# Patient Record
Sex: FEMALE | Race: WHITE | NOT HISPANIC OR LATINO | Employment: FULL TIME | URBAN - METROPOLITAN AREA
[De-identification: names, ages, dates, MRNs, and addresses within clinical notes are randomized per-mention and may not be internally consistent; named-entity substitution may affect disease eponyms.]

---

## 2019-09-04 ENCOUNTER — TELEPHONE (OUTPATIENT)
Dept: GASTROENTEROLOGY | Facility: CLINIC | Age: 39
End: 2019-09-04

## 2019-09-04 NOTE — TELEPHONE ENCOUNTER
Called & spoke to pt. Scheduled her for appt 9/11/19 at 2pm w/ 1:30pm check-in. Verified pts email. Sent new pt email.

## 2019-09-06 ENCOUNTER — TELEPHONE (OUTPATIENT)
Dept: GASTROENTEROLOGY | Facility: CLINIC | Age: 39
End: 2019-09-06

## 2019-09-06 NOTE — TELEPHONE ENCOUNTER
Called pt to confirm appoint on 09/11 @ 2 with a 1:30 arrival  Pt stated she was actually trying to reach us to cancel that appointment  She will be having surgery that day   She would like to know when she can reschedule  I explained I will send the message over and we will be in touch to r/s appoint   Wished her the best on surgery

## 2019-09-06 NOTE — TELEPHONE ENCOUNTER
LM for pt informing her that I spoke to Dr Vazquez who in turn spoke to Dr Plunkett. Informed her that following surgery, once Dr Plunkett deems her fit, he can re-refer her to us & we will get her scheduled for an appt. Left direct line for any questions [phone]

## 2019-10-01 ENCOUNTER — TELEPHONE (OUTPATIENT)
Dept: GASTROENTEROLOGY | Facility: CLINIC | Age: 39
End: 2019-10-01

## 2019-10-01 NOTE — TELEPHONE ENCOUNTER
----- Message from Nahum Mcleod sent at 10/1/2019 12:32 PM CDT -----  Contact: Pt  Type:  Needs Medical Advice    Who Called: The caller would like a call back to schedule an appt.    Best Call Back Number: [phone]

## 2019-10-01 NOTE — TELEPHONE ENCOUNTER
Called & spoke to pt. Rescheduled for 11/25/19 at 11 am w/ 10:30 arrival. Resent new pt email w/ updated info. Pt expressed understanding.

## 2019-10-31 ENCOUNTER — TELEPHONE (OUTPATIENT)
Dept: GASTROENTEROLOGY | Facility: CLINIC | Age: 39
End: 2019-10-31

## 2019-10-31 ENCOUNTER — OFFICE VISIT (OUTPATIENT)
Dept: GASTROENTEROLOGY | Facility: CLINIC | Age: 39
End: 2019-10-31
Payer: COMMERCIAL

## 2019-10-31 VITALS
OXYGEN SATURATION: 99 % | SYSTOLIC BLOOD PRESSURE: 104 MMHG | BODY MASS INDEX: 20.41 KG/M2 | HEIGHT: 66 IN | WEIGHT: 127 LBS | HEART RATE: 77 BPM | TEMPERATURE: 98 F | RESPIRATION RATE: 14 BRPM | DIASTOLIC BLOOD PRESSURE: 68 MMHG

## 2019-10-31 DIAGNOSIS — K21.9 GASTROESOPHAGEAL REFLUX DISEASE, ESOPHAGITIS PRESENCE NOT SPECIFIED: ICD-10-CM

## 2019-10-31 DIAGNOSIS — K50.00 CROHN'S DISEASE OF SMALL INTESTINE WITHOUT COMPLICATION: Primary | ICD-10-CM

## 2019-10-31 DIAGNOSIS — M12.80: ICD-10-CM

## 2019-10-31 DIAGNOSIS — D84.9 IMMUNOSUPPRESSED STATUS: ICD-10-CM

## 2019-10-31 PROCEDURE — 99245 OFF/OP CONSLTJ NEW/EST HI 55: CPT | Mod: S$GLB,,, | Performed by: INTERNAL MEDICINE

## 2019-10-31 PROCEDURE — 99999 PR PBB SHADOW E&M-EST. PATIENT-LVL IV: ICD-10-PCS | Mod: PBBFAC,,, | Performed by: INTERNAL MEDICINE

## 2019-10-31 PROCEDURE — 99999 PR PBB SHADOW E&M-EST. PATIENT-LVL IV: CPT | Mod: PBBFAC,,, | Performed by: INTERNAL MEDICINE

## 2019-10-31 PROCEDURE — 99245 PR OFFICE CONSULTATION,LEVEL V: ICD-10-PCS | Mod: S$GLB,,, | Performed by: INTERNAL MEDICINE

## 2019-10-31 RX ORDER — CHOLECALCIFEROL (VITAMIN D3) 125 MCG
5000 CAPSULE ORAL DAILY
COMMUNITY

## 2019-10-31 RX ORDER — FOLIC ACID 1 MG/1
1 TABLET ORAL DAILY
COMMUNITY
End: 2020-03-23

## 2019-10-31 RX ORDER — METRONIDAZOLE 500 MG/1
500 TABLET ORAL
COMMUNITY
Start: 2019-08-15 | End: 2020-01-27

## 2019-10-31 RX ORDER — NYSTATIN 500000 [USP'U]/1
500000 TABLET, COATED ORAL
COMMUNITY
Start: 2019-09-04 | End: 2020-01-27

## 2019-10-31 RX ORDER — IRON,CARBONYL/ASCORBIC ACID 100-250 MG
1 TABLET ORAL DAILY
COMMUNITY
Start: 2019-05-29

## 2019-10-31 RX ORDER — FAMOTIDINE 40 MG/1
40 TABLET, FILM COATED ORAL 2 TIMES DAILY
COMMUNITY
Start: 2019-09-04 | End: 2020-03-23

## 2019-10-31 RX ORDER — AZATHIOPRINE 50 MG/1
100 TABLET ORAL DAILY
COMMUNITY
Start: 2019-10-11 | End: 2022-11-09

## 2019-10-31 RX ORDER — NORGESTIMATE AND ETHINYL ESTRADIOL 0.25-0.035
1 KIT ORAL DAILY
Refills: 3 | COMMUNITY
Start: 2019-09-17 | End: 2020-01-27

## 2019-10-31 NOTE — PROGRESS NOTES
[facility]          Inflammatory Bowel Disease New Patient Consultation Note         TODAY'S VISIT DATE:  10/31/2019    Reason for Consult:    Chief Complaint   Patient presents with    Crohn's Disease     PCP: Fitz Frias      Referring MD:   Dr. Jason Plunkett    History of Present Illness:    Dear. Dr. Jason Plunkett    Thank you for requesting a consultation on your patient Angelina Castaneda who is a 39 y.o. female seen today at the [facility] on 10/31/2019 for inflammatory bowel disease- Crohn's disease.  Pertinent past medical history includes miscarriage (wokup for APS neg), 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, abnormal Pap smear with low-grade squamous intraepithelial lesion, vitamin B12 deficiency, GERD (on pepcid).      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 She had synovectomy.  CRP January 2006 was elevated at 35.4 with ESR 38 and anemia with hemoglobin 10.7.  Symptoms remitted thereafter with methotrexate 15 mg p.o. Weekly (helped with arthropathy and no SE).  At her visit in April 2007 she did report some intermittent pain in both lateral hips.  She continued on Voltaren 75 mg twice a day and methotrexate 15 mg p.o. weekly.  She was lost to follow-up between April 2007 and November 2007 due to insurance changes. In late summer 2007 she began with recurrent pain and swelling of the left knee and by that time had not been taking methotrexate on a regular basis though had decreased the dose to 10 mg once a week.  She restart methotrexate 15 mg once a week with Voltaren 75 mg twice a day.  This seemed to remit her symptoms but not completely resolve the symptoms and therefore biologic treatments discussed and Humira recommended.  She  started Humira and then was on 40 mg SC every 2 weeks while continuing methotrexate 15 mg p.o. once a week.  By 2009 she was in clinical remission with her joints and was not requiring Voltaren though had a flare up in October 2009 that coincided with her being 10 days late for her Humira and decreasing her methotrexate from 10 mg weekly to 7.5 mg weekly.  Her symptoms went into remission with a Medrol Dosepak and increase of methotrexate 10 mg weekly while she continued on Humira 40 mg SC every 2 weeks.  In June 2010 she had progressive and recurrent pain with swelling and stiffness of the left knee and had aspiration intra-articular steroids.  In August 2010 she had return of left knee symptoms and required another Medrol Dosepak.  At that time her Humira was continued and her methotrexate was increased to 15 mg p.o. once a week.  In summary 2011 she continued to have mild anemia with slightly decreased folate and B12 levels.  Overall she was feeling well by fall of 2011 except for some mild recurrence swelling of the left knee and stiffness.  She continued on Humira 40 mg SC every 2 weeks, methotrexate 15 mg p.o. weekly with folic acid 1 mg daily.  By 2013 her methotrexate and Humira discontinued due to ineffective.  In fall 2013 she started simponi and restarted MTX 10 mg po weekly 10/2013.     In December 2013 she had recurrent left knee pain and swelling and underwent aspiration and injection with Kenalog.  By May 2014 she continued on Simponi once a month and methotrexate 15 mg p.o. weekly.  In January 2015 she saw surgery for a large external hemorrhoid at which time the plan was to proceed with an external hemorrhoid excision.  Biopsies of the hemorrhoid suggested atypical epithelial change with suspicion for viral cytopathic changes and HPV negative.  Patient recurrence left knees pain and swelling in March of 2015 at which time aspiration and steroid injection was done.  At that time she had continued on  Simponi monthly and methotrexate 10 mg weekly.  By April 2015 her methotrexate was increased to 17.5 mg once a week while continuing on monthly Simponi.  In early June 2015 Simponi was discontinued and patient was started on Cimzia and by June 2015 she was on Cimzia every 2 weeks while continuing methotrexate 15 mg p.o. weekly.  In July 2015 due to recurrence symptoms of large knee inflammatory infusion she took a 2 week course of prednisone which was helpful.  She had repeat aspiration injection of steroids in July of 2015 of her left knee. After being on cimzia for about 2 mos her joint pains worsened considerably.  She started on xeljanz (unknown dose) which helped her joint pains and discontinued within a few mos and this was discontinued due to family planning in future.  She saw surgeons September 2015 again at which time she was found to have irritated anal skin with inflammation and bleeding associated with bowel movements with inflammed perianal skin tag with findings concerning for Crohn's disease. Patient saw Dr. Plunkett on 10/15/2015 and he felt that the perianal issues were more likely to be consistent with fistula.  By this time patient was on Xeljanz for her joint issues.  EGD November 2015 was normal. Colonoscopy showed severe colitis and ileitis in the ileocecal valve and unable to intubate the terminal ileum due to stricturing of the IC valve with mucosal ulcerations.  There is also granulation tissue internally like healed fissure and posterior midline and left lateral fistula granulation with prominence.  Otherwise the colon appeared normal. Patient was started on Pentasa, continued methotrexate and plans were to change from Xeljanz to Humira.  Also ciprofloxacin and Flagyl was started.  Biopsies of the duodenum were normal.  Biopsies of the IC valve showed chronic active colitis.  SBFT November 2015 showed loss of normal fold pattern in the patient's terminal ileum. Around Nov/Dec 2015 she  started Humira and MTX 15 mg po weekly. She was not tolerant of Flagyl.  At her visit with Dr. Plunkett December 2015 she requested to stop methotrexate (future family planning) and finally discontinued in January 2016 while continuing Humira 40 mg SC every 2 weeks and Pentasa.  By March 2016 she was feeling well on this regimen and had minimal bleeding once or twice a week with rare mucus.  She saw Rheumatology April 2016 and May 2016 after developing right knee pain and swelling at which time aspiration injection of steroids was done.  At her visit with Rheumatology May of 2016 her rheumatologist was going to coordinate with Gastroenterology to change Pentasa to sulfasalazine to help with joint issues.  In July 2016 got . In September of 2016 she again had right knee infusion that was aspirated and steroids were injected.  She became pregnant in the end of September 2016 (delivered healthy baby girl by Csection and baby was 3 weeks early- 5/29/17) at which time she was asymptomatic from her bowel disease and at her visit on 10/21/2016 Pentasa was discontinued and she was started on Flagyl.  There is notation of getting Humira levels but but this was deferred due to patient symptomatic improvement by in November of 2016.  She continued on Humira 40 mg SC every 2 weeks as monotherapy during pregnancy and stopped at 28 weeks per recs by Dr. Plunkett. In January 2017 she had aspiration and injection of steroids into her right knee due to her current knee pain and swelling.  She saw Dr. Plunkett 3/31/2017 at which time she was 28 weeks pregnant and he was instructed to discontinue Humira.  Approximately 2 weeks after delivery she developed some abdominal cramping, arthralgia and knee infusion and pain. Her rheumatologist resumed her Humira and she had a rapid response with improve knee pain and resolution of her abdominal cramping though some rectal bleeding including drips of blood with bowel movements  intermittently.  She was given Anusol HC cream for suspected perianal or hemorrhoidal bleeding and did not wish to resume Pentasa postpartum (resumed for 2 mos).  In mid September 2017 she had recurrence of right knee pain and swelling in saw her rheumatologist at which time the knee was again aspirated and injected with steroids.  She saw Dr. Plunkett in January 2018 with stable symptoms with the plan to do a colonoscopy November 2018.  She had tried sulfasalazine for joint issues early on and sometime 2018 she was started again on sulfasalazine which was discontinued 8/2019. Bone density test 8/10/2018 was normal. She underwent a colonoscopy on 8/27/2018 for generalized abdominal pain and chronic diarrhea which showed discontinues areas of nonbleeding ulcerated mucosa at IC valve consistent with Crohn's ulcer that was injected with epinephrine due to bleeding.  She had also perianal skin tags with some some rectal tenderness an anal stricture.  There was ulcerated mucosa at the anus and in the distal rectum.  Small pedunculated polypoid lesion at the IC valve most consistent with pseudopolyps and there was a benign-appearing intrinsic severe stenosis of the IC valve that did not allow intubation of the terminal ileum. Biopsies of the IC valve showed acutely inflamed mucosal ulcer with reactive changes and the polyp was consistent with an inflammatory hyperplastic polyp.  At that time Dr. Plunkett recommended ciprofloxacin and Flagyl for 1 week, prednisone and to increase Humira to 40 mg SC weekly.  Although I do not have the Humira levels there is a note indicating that she had low levels with no antibodies when this change was made.  She tapered off of prednisone by the end of September 2018 while continuing Humira 40 mg SC weekly.  In 92018 she had a miscarriage. At her visit on 9/26/2018 with Dr. Plunkett she mentioned that she would like to get pregnant again and he wrote that he felt that if her symptoms  improved during pregnancy then she can decrease her Humira at to 40 mg SC every 2 weeks.  Her symptoms did improved on Humira 40 mg SC weekly with some residual minor bloating and abdominal cramping with rare diarrhea.  In addition to the Humira she was also on sulfasalazine with folic acid.  By April 2019 she began to family plan again and at that time she had abdominal cramping with possible partial small-bowel obstruction for 2 days with intermittent episodes as well.  Dr. Plunktet was concerned that the IC valve stricture was playing a role in the symptoms and recommended repeat Humira levels and antibodies and if adequate levels then change to Remicade given loss of response of weekly Humira.  She did become pregnant and had a miscarriage in the early summer of 2019.  Dr. Plunkett had discontinued sulfasalazine during her short pregnancy though her maternal fetal medicine doctor recommend that she can remain on it.  Dr. Plunkett saw patient on 8/12/2019 at which time she was continued on Humira SC weekly and her sulfasalazine was increased to 3 g/day.  IV iron was also being considered.  On 8/15/2019 she was seen in the GI clinic again due to fever of 101, lower abdominal pain and discomfort with bilateral suprapubic and right lower quadrant pain. She had blood work showing leukocytosis with a pregnancy test that was negative.  CT abdomen pelvis with IV and oral contrast on 8/15/2019 showed interloop abscess in the right lower quadrant with thick walls and loculation measuring 5.1 x 3.7 cm with peripheral enhancement.  This is adjacent to a very abnormal loop of terminal ileum with significant wall thickening and inflammatory changes.  There is additional edematous changes in the right lower quadrant adjacent to the cecum with mild cecal wall thickening and trace pelvic fluid though appendix was difficult to visualize due to inflammatory process.  There was no small-bowel obstruction or free intraperitoneal  air.  When she saw Dr. Plunkett on 8/15/2019 she was afebrile and starting to feel somewhat better.  At that visit Dr. Plunkett recommended stopping Humira, starting Augmentin twice daily for 14 days and Flagyl for 5 days.  He recommended stopping sulfasalazine and beginning Pentasa 4 g/day.  Labs on 8/16/2019 was significant for sodium 133 with normal creatinine, , white blood cell count 18.2, hemoglobin 11.0.  I have results from a gram stain of an abdominal abscess that showed preliminary findings of Gram-positive cocci.  Along with Candida on fungal cultures and negative anaerobic cultures with 1+ E coli.  She had a negative pregnancy test on 8/19/2019 and and normal pro calcitonin and negative C diff.  Albumin on 8/20/2019 is 2.8.      Patient hospitalized from 8/16-8/20/19 and was admitted from GI clinic when she presented with persistent RLQ abdominal pain, fever and nausea despite a few days of augmentin and flagyl.  As inpatient she was started on zosyn and flagyl and CT showed loculated abscess and she underwent IR guided abscess drainage and abscess cultures grew E coli.  Surgery and ID were consulted. 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  She was then discharged on 8/20/19 with IV ampicillin and po flagyl for 2 weeks with plan for f/u in GI clinic in 1 week and CTE the week after discharge. Patient was again hospitalized from 9/7-9/13/19 at which time it was felt that IV antibiotic treatment was unsuccessful at home and underwent laparoscopic mobilization of right colon with hand assist converted to open resection of distal 1 foot of ileum and cecum with drainage of interloop abscess on 9/10/19. Surgical path showed Crohns disease with clear margins.  She had spinal fluid leak from anesthesia procedure for surgery and had blood patch.  After surgery she recovered well overall though had perianal issues that were ongoing and increased diarrhea though this has  improved. She has 1-3 soft BMs/day with some urgency, no blood in stool.  Patient was started on remicade 5 mg/kg with first dose 10/14/19, 2nd dose 10/28/19, 3rd dose 19 and started imuran 100 mg daily end of 2019.  She continued on folic acid 1 mg daily. She has nausea she attributes to flagyl which she has been on since 19. She takes pepcid 40 mg po BID for heartburn and about a year ago was on protonix.     Prior Pertinent Surgeries:    synovectomy  2015 Hemorrhoidectomy  2019 ileocolonic resection (righ colon removed, 1 foot of ileum removed). Surgical path showed Crohns disease with clear margins.    Pertinent Endoscopy/Imagin2015 EGD normal  2015 colonoscopy: severe colitis and ileitis in the ileocecal valve and unable to intubate the terminal ileum due to stricturing of the IC valve with mucosal ulcerations.  There is also granulation tissue internally like healed fissure and posterior midline and left lateral fistula granulation with prominence.  Otherwise the colon appeared normal  2015 SBFT: normal  18 colonoscopy: discontinuous areas of nonbleeding ulcerated mucosa at IC valve consistent with Crohn's ulcer that was injected with epinephrine due to bleeding.  She had also perianal skin tags with some some rectal tenderness an anal stricture.  There was ulcerated mucosa at the anus and in the distal rectum.  Small pedunculated polypoid lesion at the IC valve most consistent with pseudopolyps and there was a benign-appearing intrinsic severe stenosis of the IC valve that did not allow intubation of the terminal ileum. Biopsies of the IC valve showed acutely inflamed mucosal ulcer with reactive changes and the polyp was consistent with an inflammatory hyperplastic polyp.  CT abdomen pelvis with IV and oral contrast on 8/15/2019 showed interloop abscess in the right lower quadrant with thick walls and loculation measuring 5.1 x 3.7 cm with peripheral enhancement.  This  is adjacent to a very abnormal loop of terminal ileum with significant wall thickening and inflammatory changes.  There is additional edematous changes in the right lower quadrant adjacent to the cecum with mild cecal wall thickening and trace pelvic fluid though appendix was difficult to visualize due to inflammatory process.  There was no small-bowel obstruction or free intraperitoneal air.   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       Pertinent Labs:  None    Therapeutic Drug Monitoring Labs:  Humira levels:  note indicating that she had low levels with no antibodies when this change was made.    Prior IBD Therapies:  Prednisone  Sulfasalazine  MTX 7.5 to 15 mg po weekly- discontinued 1/2016  Humira (started summer 2007, stopped 2013, restarted 2015, stopped 8/2019)  Simponi (10/2013-6/2015)  Cimzia (6/2015)  Xeljanz (2015)  Pentasa (2015)    Vaccinations:  Influenza (inactive):  Recommended yearly  Pneumococcal PCV 13: recommended  Pneumococcal PCV 23: recommended 2-12 mos after prevnar 13  Tetanus (TdaP): recommended every 10 years  HPV (males and females ages 19-25 yo):    Not sure  Meningococcal: UTD with childhood vaccines  Hepatitis B:  Immune 1/2012  Hepatitis A:  Will check HAV IgG for immunity  MMR (live vaccine):  UTD with childhood vaccines  Chickenpox status/Varicella (live vaccine): VZV IgG positive 2007, immune  Shingrix: recommended after age 49 yo due to current shortage    Review of Systems   Constitutional: Negative for chills, fever and weight loss.   HENT:        No oral ulcers, dysphagia, oral thrush   Eyes: Negative for blurred vision, pain and redness.   Respiratory: Negative for cough and shortness of breath.    Cardiovascular: Negative for chest pain.   Gastrointestinal: Positive for heartburn and nausea. Negative for abdominal pain and vomiting.   Genitourinary:  Negative for dysuria and hematuria.   Musculoskeletal: Negative for back pain and joint pain.   Skin: Negative for rash.   Psychiatric/Behavioral: Negative for depression. The patient is not nervous/anxious and does not have insomnia.      Medical/Surgical History:    has a past medical history of Anemia, Arthritis, Crohn's disease, GERD (gastroesophageal reflux disease), and Inflammatory bowel disease.   has a past surgical history that includes Bowel resection (09/10/2019); Augmentation of breast (); Fistula Repair ();  section (); [place] tooth extraction; and Appendectomy.    Family History:   family history includes COPD in her maternal grandfather and paternal grandfather; Hypertension in her father, mother, and paternal grandfather; Irritable bowel syndrome in her maternal grandmother; Psoriasis in her father.    Social History:    reports that she has never smoked. She has never used smokeless tobacco. She reports that she drinks about 3.0 standard drinks of alcohol per week. She reports that she does not use drugs.    Review of patient's allergies indicates:  No Known Allergies    Current Medications:   Outpatient Medications Marked as Taking for the 10/31/19 encounter (Office Visit) with Arthur Vazquez MD   Medication Sig Dispense Refill    azaTHIOprine (IMURAN) 50 mg Tab Take 100 mg by mouth once daily.      cholecalciferol, vitamin D3, 5,000 unit capsule Take 5,000 Units by mouth once daily.      famotidine (PEPCID) 40 MG tablet Take 40 mg by mouth 2 (two) times daily.      folic acid (FOLVITE) 1 MG tablet Take 1 mg by mouth once daily.      infliximab (REMICADE IV)       iron-vitamin C 100-250 mg, ICAR-C, 100-250 mg Tab TAKE 1 TABLET EVERY DAY      metroNIDAZOLE (FLAGYL) 500 MG tablet Take 500 mg by mouth.      nystatin (MYCOSTATIN) 500,000 unit Tab Take 500,000 Units by mouth.      prenatal vit-iron fum-folic ac (RIGHT STEP PRENATAL VITAMINS) 27 mg iron- 0.8 mg Tab Take 1  "tablet by mouth.      SPRINTEC, 28, 0.25-35 mg-mcg per tablet Take 1 tablet by mouth once daily.  3     Vital Signs:  /68 (BP Location: Left arm, Patient Position: Sitting)   Pulse 77   Temp 98.1 °F (36.7 °C)   Resp 14   Ht 5' 6" (1.676 m)   Wt 57.6 kg (126 lb 15.8 oz)   LMP 10/10/2019   SpO2 99%   BMI 20.50 kg/m²     Physical Exam   Constitutional: She is oriented to person, place, and time. She appears well-developed.   HENT:   Mouth/Throat: Oropharynx is clear and moist. No oral lesions.   No oral ulcers or thrush   Eyes: Pupils are equal, round, and reactive to light. Conjunctivae are normal.   Cardiovascular: Normal rate and regular rhythm.   Pulmonary/Chest: Effort normal and breath sounds normal.   Abdominal: Soft. There is no tenderness.   Abdominal scar   Musculoskeletal:        Right lower leg: She exhibits no swelling.        Left lower leg: She exhibits no swelling.   Neurological: She is alert and oriented to person, place, and time.   Skin: No rash noted.   Psychiatric: She has a normal mood and affect.   Nursing note and vitals reviewed.    Labs: reviewed and pertinent noted above    Assessment/Plan:  Angelina Castaneda is a 39 y.o. 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  In 2006 patient presented with seronegative inflammatory mono arthropathy of the left knee with HLA B27 positive and seen by Rheumatology.  She underwent a synovectomy and at that time was also found to have elevated inflammatory markers and iron deficiency anemia.  She was started on methotrexate 50 mg p.o. weekly with good response along with oral Voltaren.  She was lost to follow-up in part of 2007 and in the summer of 2007 " had recurrent right knee swelling and pain at which time she was on methotrexate 10 mg p.o. once weekly and it was increased back to 15 mg weekly along with Voltaren orally.  In approximately 2007 she started Humira while continuing same dose methotrexate and felt well except for a small flare up of her knee in 2009 when she was 10 days late for Humira dose and had decreased her methotrexate from 10 mg weekly to 7.5 mg weekly and had good response to Medrol Dosepak.  In followup 2011 she had a mild recurrence of left knee swelling and pain while on Humira 40 mg SC every 2 weeks and methotrexate 15 mg p.o. weekly.  In 2013 both were discontinued and she started in the fall of 2013 on Simponi along with restarting methotrexate 10 mg po weekly.  She had recurrent left knee pain and swelling requiring aspiration injection with Kenalog in December of 2013.  By May 2014 she continued on Simponi once a month with methotrexate 15 mg p.o. weekly.  She underwent external hemorrhoidectomy in January 2015 with recurrence of left knee pain and swelling in March of 2015 requiring same treatment.  By that time she continued on Simponi monthly and methotrexate 10 mg weekly.  By April 2015 methotrexate was increased to 17-,1/2 mg once a week while continuing monthly Simponi.  In early 2015 Simponi was discontinued and Cimzia was started.  It was optimized to every 2 weeks along with methotrexate 15 mg weekly though had recurrence of left knee effusion and arthropathy requiring 2 weeks of prednisone and therefore after about 2 months this was discontinued given worsening of arthropathy.  In approximately 2015 her rheumatologist started her on Xeljanz which helped her joint pains though it was discontinued due to plans for family planning.  In September 2015 she saw the surgeons for an inflamed perianal skin tag that was felt to be related to Crohn's disease and was referred referred to Gastroenterology for workup.  In November 2015  patient had an upper endoscopy that was normal and a colonoscopy that showed severe inflammation of the IC valve though unable to intubate the terminal ileum biopsies did show stricture of the IC valve and biopsies of the ileum were consistent with ileitis.  She started ciprofloxacin and Flagyl and a small-bowel follow-through November 2015  showed loss of normal fold pattern in terminal ileum. In 1/2016 MTX discontinued due to family planning while humira 40 mg SC every 2 weeks and pentasa were continued.  In March 2016 she was  overall she was feeling well except for some minimal bleeding/mucus.  IN 4/2016, 9/2016, 1/2017, 9/2017 patient had recurrent right knee swelling pain and treated again with steroid injection and aspiration. She was pregnant and overall asymptomatic from GI perspective during pregnancy and delivered baby 9/2016. She held humira at 28 weeks gestation.  About 2 weeks after delivery she developed abdominal cramping, knee pain/swelling and her rheumatologist resumed her Humira with resolution of symptoms. She also took anusol cram for suspected perianal/hemorrhoid issues.  After delivery she did not resume pentasa.  In Jan 2018 she had stable symptoms and was on sulfasalazine in 2018 and this was stopped 11/2018.  DEXA normal 8/2018. Colonoscopy 8/27/18 done due to abd pain, diarrhea showed ICV inflammation that was bleeding and treated with injection of epinephrine and in addition pt had perianal skin tags and anal stricture with ulceration of the anus and distal rectum. Also at ICV there were pseudopolyp with severe ICV stricture that did not allow intubation of TI and biopsies confirmed inflammatory hyperplastic polyps and acute inflammation of ICV.  She took cipro/flagyl x 1 week, prednisone and humira increased to weekly (per notes the humira was increased due to low humira levels). By 9/2018 she was off prednisone. She had miscarriage 9/2019 and again summer 2019 with workup including  congenital anomalies and no evidence of hypercoagulable disorders.  She had some improvement of symptoms with the Humira and was also on sulfasalazine.  In 4/2019 she had symptoms of abdominal cramping and possible partial small-bowel obstruction.  Sulfasalazine was briefly discontinued due to pregnancy until miscarriage.  At her visit on 8/12/2019 she continued on Humira 40 mg SC weekly with sulfasalazine 3 g/day and IV iron was being considered.  About 3 days later she was seen for fevers and significant abdominal pain with labs showing leukocytosis.  CT A/P on 8/15/19 showed interloop abscess in the right lower quadrant with inflammation along with mild cecal wall thickening.  Dr. Plunkett stopped Humira and started augmentin and flagyl. Sulfasalazine was stopped and pt started on pentasa.  Pt continued with mild anemia, low albumin, high CRP, dehydration, leukocytosis and abscess gram stain showed gram positive cocci, candida on fungal cx. She was hospitalized from 8/16-8/20/19 for persistent RLQ abd pain, fevers, nausea after few days of abx. As inpatient pt started on zosyn, flagyl and CT showed loculated abscess.  IR guided drainage of abscess and grew E. Coli. 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 She was discharged home on IV ampicillin and pos flagyl for 2 weeks with close f/u though was hospitalized again from 9/7-9/13/19.  She underwent ileocolonic resection (righ colon removed, 1 foot of ileum removed) with drainage of abscess on 9/10/2019. Postop did well except for needing blood patch for spinal fluid leak.  She has ongoing perianal skin tags but no other obvious perianal issues.  She started on remicade 5 mg/kg on 10/14/19 and due for 3rd dose on 11/26/19. She also started on imuran 100 mg daily 3-4 weeks ago. She has nausea which she attributes to flagyl which she has been on since 8/14/19. She takes pepcid 40 mg po BID for heartburn and about a year ago was on protonix.     Patient is now  on remicade (3rd dose on 11/26/19) with imuran 100 mg daily after recent surgery for ileal/ICV Crohn's disease with stricture and intrabdominal abscess.  She has recovered well after surgery. Going forward we will get proactive 12 week remicade levels along with thiopurine metabolites. She will get some basic labs though prefers due to cost to get these at [facility] (orders given to patient).  She will need a colonoscopy with evaluation of anastomosis and neoterminal ileum 6 mos after surgery in early 3/2019. We discussed risks and benefits of flagyl to prevent postop recurrence though she has nausea/heartburn and has taken it for 6 weeks so reasonable to discontinue. We also discussed family planning and these meds if she is pregnant. I told her if she is anxious to get pregnant we can move up the colonoscopy to Feb 2020 and either me or Dr. Plunkett can do this.  She will have f/u in Jan 2020 and then we will make further decisions.     # Crohn's disease (ileal/ICV, perianal skin tags/anal stricture, S/P ileal/right colon resection due to intrabdominal abscess 9/2019):   - I had a long discussion with patient regarding epidemiology, potential etiologies, associations and triggers (avoiding NSAIDS, using antibiotics with caution,stress and smoking effects on disease state), diagnosis, management goals and treatment options   - perianal skin tags- recommended pt not get these cosmetically removed given there could be complications in setting of Crohn's disease or worsening of perianal disease  - continue remicade 5 mg/kg every 8 weeks  - continue imuran 100 mg po daily  - discussed MOA, risk of remicade and imuran with patient  - vitamin B12 deficiency: will check vit B12 level- likely will need supplementation  - colonoscopy 6 mos after surgery to evaluate for early recurrence of Crohn's disease  - stop flagyl  - CRC risk: sx onset 2015, ileal/ICV/perianal, average risk  - basic labs: CBC, CMP, CRP,  HCV Ab, HIV, thyroid testing, celiac Ab testing- pt prefers to get at [facility] due to costs  - drug monitoring labs: CBC/LFTs in 1 month on 19 for imuran monitoring (orders given to patient), TPMT, TB quantiferon (negative 2019, repeat), Hep B testing (HBsAg, HBtotalcoreAb)  - TDM: 12 week remicade level/abs and thiopurine metabolites- orders given to patient to get [facility] on 19 [facility]    # 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  Ideal to do colonoscopy in early 2020 about 6 mos after surgery but we can do a little earlier if she is anxious to family plan  - no live vaccines for  in first 6 mos of life  - once pregnant we can discuss when last dose of remicade should be  - breastfeeding safe with remicade and imuran though if possible would not breastfeed up to 4 hours     # GERD  - continue pepcid BID but may be able to slowly decrease given symptoms may be due to either flagyl or recent active Crohn's disease    # IBD specific health maintenance:  Eye exam yearly  Skin exam yearly   Risk for osteopenia/osteoporosis- none  Pap smear yearly due to chronic immunosuppression  Vitamin D- ordered level, continue vit D 3 5000 IU daily  Vaccines: no live vaccine, list of recommended vaccines given to patient- recommend flu shot yearly,  prevnar 13 and then 2-12 mos later pneumovax 23, tetanus every 10 years, immune to hep B and will check immunity to hep A    Follow up: end of Jan 2020    Thank you again for sending Angelina Castaneda to see Dr. Arthur Vazquez today at the [facility]. Please don't hesitate to contact Dr. Vazquez if there are any questions regarding this evaluation, or if you have any other patients with inflammatory bowel disease for whom you would like a consultation. You can reach Dr. Vazquez at [phone] or by email at [email or web address]    Arthur Vazquez MD  Department of Gastroenterology  Medical Director, Inflammatory Bowel Disease

## 2019-10-31 NOTE — TELEPHONE ENCOUNTER
Called & spoke to pt. Informed her that unfortunately we are booked solid for today so there is no earlier appt time available. Pt expressed understanding & states she will be here at her original time.

## 2019-10-31 NOTE — PATIENT INSTRUCTIONS
Instructions:  - print labs and given to patient and make sure I receive results- get this done this week and no later than early next week, please make sure you fax us results to [phone]  - continue remicade   - get remicade levels/abs and imuran levels (thiopurine metabolites) done on 1/7/19- give orders to patient; Ms Castaneda please make sure they do [facility] assay at [facility] and if not then get this done at a [facility] facility  - a week after your get this drawn make sure we received results. Based on levels we may need to proactively adjust your dose of remicade  - continue imuran  - CBC/LFTs in 1 month- 11/27/19 to monitor imuran and if all okay then every 3 mos  - colonoscopy first week of 3/2020- let me know who you will be getting with once you decide  - Avoid all NSAIDs (Advil, Ibuprofen, Motrin, Aspirin, Naprosyn, Aleve)  - Yearly Pap Smears recommended if immunosuppressed   - Yearly Eye exam  - Yearly Skin exam--wear sun block and hats  - Use antibiotics with caution and only take if necessary (such as for dental procedures); if you take antibiotics after completing take florastor 1 capsule twice a day for 2 weeks (this is a probiotic that can help restore your bowel edil and prevent C diff)  - Since you are immunosuppressed please refrain from eating any raw seafood   - Vaccines recommended- no live vaccines, flu shot yearly, tetanus every 10 years, prevnar 13 and then 2-12 mos later pneumovax 23, hepatitis A vaccine if you are not immune  - Follow up: end of Jan 2020    Infliximab (Remicade): Biologics - Anti-TNF Agent    - Mechanism of action:  Remicade blocks TNF alpha which plays a role in the inflammatory process for inflammatory bowel disease  - Labs   - we have or will be checking labs to determine the safety of taking this medication including baseline blood counts, liver and kidney function tests, TB test and viral hepatitis testing    Remicade Dosage:  - Loading Dose: 5 mg/kg IV  week 0, 2, 6 (infused over 2 hours)  - Maintenance Dose: 5 mg/kg mg IV every 8 weeks  - 2-3 hour infusion    Risks of Remicade:  Stop therapy due to adverse event=10% (10/100)    Please call us immediately if you develop any of the below problems    Allergic reactions  - <2% (2/100) develop infusion site reactions  - RARE- hives (rash), difficulty breathing, chest pain/tightness, high or low blood pressure, swelling of the face and hands, fever or chills    Serious Infections=3% (3/100)  fever, tiredness, flu, open sores, warm red painful skin    Blood Disorders  fever that doesn't go away, bruising, bleeding, severe paleness    Non-Hodgkins Lymphoma=0.06% (6/10,000)    Drug related lupus-like reaction=1% (1/100)  chest discomfort or pain that does not go away, shortness of breath, joint pain, rash on the cheeks or arms that gets worse in the sun    Psoriasis  new or worsening red scaly patches or raised bumps on the skin that are filled with pus     Case Reports Only: Multiple Sclerosis and Guillain [place] Syndrome  Numbness/weakness/tingling of your hands and feet, changes in your vision or seizures    Case Reports Only: New or worsening Congestive Heart Failure  shortness of breath, swelling in your ankles or feet, sudden weight gain    Case Reports Only: Serious Liver Injury  jaundice (yellow skin or eyes), dark brown urine, right-sided abdominal pain, fever, severe itchiness    Vaccine Counseling  NO LIVE VACCINES UP TO 8 WEEKS PRIOR TO STARTING THIS MEDICATION, DURING OR 8 WEEKS AFTER STOPPING THIS MEDICATION    - Live Vaccines Include:   --Intranasal Influenza A/B  --Measles, Mumps, Rubella (MMR)  --Rotavirus  --Typhoid (oral)  --Vaccina (Smallpox)  --Varicella (Chicken Pox)  --Yellow Fever  --Zoster    Azathioprine (Imuran) is an immunomodulator. This medication can weaken or modulate the activity of the immune system which decreases the gut inflammation.    Risks of azathioprine:  Stop therapy due to  adverse event = 11% (11/100)  Allergic reaction (drug fever, arthritis, rash) = 2% (2/100)  Pancreatitis (typical symptoms- abdominal pain, nausea/vomiting) = 3% (3/100)  Nausea= 2% (2/100)  Hepatitis- liver inflammation with abnormal liver tests = 2% (2/100)  Serious infections (primary infections caused by viruses) = 5% (5/100)  Bone Marrow Suppression  Non-Hodgkins Lymphoma= 0.04% (4/10,000)  Non-melanoma skin cancer (basal cell and squamous cell cancer) = 0.16% (1.6/100) wear sunblock, hats, and get your skin checked once a year

## 2019-10-31 NOTE — LETTER
November 2, 2019      Jason Plunkett MD  [address]  [place] [address]           [facility] - Gastroenterology  [address]  [facility] [address]  Phone: [phone]  Fax: [phone]          Patient: Angelina Castaneda   MR Number: [id number]   YOB: 1980   Date of Visit: 10/31/2019       Dear Dr. Jason Plunkett:    Thank you for referring Angelina Castaneda to me for evaluation. Attached you will find relevant portions of my assessment and plan of care.    If you have questions, please do not hesitate to call me. I look forward to following Angelina Castaneda along with you.    Sincerely,    Arthur Vazquez MD    Enclosure  CC:  No Recipients    If you would like to receive this communication electronically, please contact [email or web address] or [phone] to request more information on [employer] Link access.    For providers and/or their staff who would like to refer a patient to [facility], please contact us through our one-stop-shop provider referral line, [facility], at [phone].    If you feel you have received this communication in error or would no longer like to receive these types of communications, please e-mail [email or web address]

## 2019-10-31 NOTE — TELEPHONE ENCOUNTER
----- Message from Silke Anderson sent at 10/31/2019  8:30 AM CDT -----  Contact: pt: [phone]  Pt would like to know if it's possible for her to come sooner to today's appt     Please contact pt: [phone]

## 2019-11-04 ENCOUNTER — TELEPHONE (OUTPATIENT)
Dept: GASTROENTEROLOGY | Facility: CLINIC | Age: 39
End: 2019-11-04

## 2019-11-04 PROBLEM — M12.80: Status: ACTIVE | Noted: 2019-11-04

## 2019-11-04 PROBLEM — K50.00 CROHN'S DISEASE OF SMALL INTESTINE WITHOUT COMPLICATION: Status: ACTIVE | Noted: 2019-11-04

## 2019-11-04 PROBLEM — K21.9 GASTROESOPHAGEAL REFLUX DISEASE: Status: ACTIVE | Noted: 2019-11-04

## 2019-11-04 PROBLEM — D84.9 IMMUNOSUPPRESSED STATUS: Status: ACTIVE | Noted: 2019-11-04

## 2019-11-04 NOTE — TELEPHONE ENCOUNTER
Called & spoke to pt. Asked her if she was able to get her labs drawn at [facility]. She states yes she was and she will fax resulted labs to us. She states she has not yet decided on who she would rather have scope her, but she will let us know.

## 2019-11-04 NOTE — TELEPHONE ENCOUNTER
----- Message from Arthur Vazquez MD sent at 11/3/2019 10:05 AM CST -----  Did pt have labs done at [facility]  Has she decided whether to get colonoscopy with me or Dr. Plunkett?    SS   (1) a few with stimulation

## 2019-11-08 ENCOUNTER — DOCUMENTATION ONLY (OUTPATIENT)
Dept: GASTROENTEROLOGY | Facility: CLINIC | Age: 39
End: 2019-11-08

## 2019-11-08 ENCOUNTER — PATIENT MESSAGE (OUTPATIENT)
Dept: GASTROENTEROLOGY | Facility: CLINIC | Age: 39
End: 2019-11-08

## 2019-11-08 NOTE — PROGRESS NOTES
Labs from [facility] 11/1/19:    HAV IgG negative  HAV IgM negative  Vitamin B12 296 (normal 211-911)  BHCG <5  HIV nonreactive  HCV Ab nonreactive  TSH 1.04 (normal)/FT4 0.91 (normal)  WBC 7.5  Hgb 11.0  Hct 36.4  pletelets 278  CRP 0.10  Na 139, K 3.7, Alb 4.2  AST 22/ALT 14/TB 0.3/ALP 50  IgA 716

## 2019-11-11 ENCOUNTER — PATIENT MESSAGE (OUTPATIENT)
Dept: GASTROENTEROLOGY | Facility: CLINIC | Age: 39
End: 2019-11-11

## 2019-11-11 NOTE — TELEPHONE ENCOUNTER
[facility]   Collected: 11/1/2019    TT G IgA:  <2 U/ mL    Hepatitis- B core total antibody: Negative

## 2019-11-18 ENCOUNTER — TELEPHONE (OUTPATIENT)
Dept: GASTROENTEROLOGY | Facility: CLINIC | Age: 39
End: 2019-11-18

## 2019-12-13 ENCOUNTER — PATIENT MESSAGE (OUTPATIENT)
Dept: GASTROENTEROLOGY | Facility: CLINIC | Age: 39
End: 2019-12-13

## 2019-12-13 DIAGNOSIS — K50.00 CROHN'S DISEASE OF SMALL INTESTINE WITHOUT COMPLICATION: Primary | ICD-10-CM

## 2020-01-14 ENCOUNTER — TELEPHONE (OUTPATIENT)
Dept: GASTROENTEROLOGY | Facility: CLINIC | Age: 40
End: 2020-01-14

## 2020-01-14 NOTE — TELEPHONE ENCOUNTER
Called & spoke to pt  -reminded her to schedule scope  -pt requested portal be sent w/ phone number

## 2020-01-14 NOTE — TELEPHONE ENCOUNTER
----- Message from Francia Adrian MA sent at 12/27/2019 10:07 AM CST -----  Has pt called and scheduled scope for March

## 2020-01-15 ENCOUNTER — DOCUMENTATION ONLY (OUTPATIENT)
Dept: GASTROENTEROLOGY | Facility: CLINIC | Age: 40
End: 2020-01-15

## 2020-01-15 ENCOUNTER — TELEPHONE (OUTPATIENT)
Dept: GASTROENTEROLOGY | Facility: CLINIC | Age: 40
End: 2020-01-15

## 2020-01-15 NOTE — TELEPHONE ENCOUNTER
Outside Labs:   [facility]     Date of labs:  01/07/2020    Results:    INFLIXIMAB (IFX) CONC+ IFX AB    Infliximab Drug Level      11    Quantitation Limit:   <0.4  Results of 0.4 or higher indicate detection os Infliximab    ANTI-INFLIXIMAB ANTIBODY    < 22     Quantitation Limit: <22  Results of 22 or higher indicate detection of anti-infliximab antibodies    22 - 200 ng/ml:  LOW antibody titer, little/no apparent reduction in free drug level.    201 - 1,000 ng/ml:  INTERMEDIATE antibody titer, variable reduction in free drug level.    1,001 or greater ng/ml:  HIGH antibody titer, notably diminished or absent free drug level.          NEXT SCHEDULED APPOINTMENT:  01/27/2020

## 2020-01-17 DIAGNOSIS — Z12.11 COLON CANCER SCREENING: Primary | ICD-10-CM

## 2020-01-17 RX ORDER — POLYETHYLENE GLYCOL 3350, SODIUM SULFATE ANHYDROUS, SODIUM BICARBONATE, SODIUM CHLORIDE, POTASSIUM CHLORIDE 236; 22.74; 6.74; 5.86; 2.97 G/4L; G/4L; G/4L; G/4L; G/4L
4 POWDER, FOR SOLUTION ORAL ONCE
Qty: 4000 ML | Refills: 0 | Status: SHIPPED | OUTPATIENT
Start: 2020-01-17 | End: 2020-01-17

## 2020-01-27 ENCOUNTER — OFFICE VISIT (OUTPATIENT)
Dept: GASTROENTEROLOGY | Facility: CLINIC | Age: 40
End: 2020-01-27
Payer: COMMERCIAL

## 2020-01-27 VITALS
BODY MASS INDEX: 22.04 KG/M2 | WEIGHT: 137.13 LBS | DIASTOLIC BLOOD PRESSURE: 70 MMHG | SYSTOLIC BLOOD PRESSURE: 103 MMHG | HEART RATE: 68 BPM | OXYGEN SATURATION: 100 % | HEIGHT: 66 IN

## 2020-01-27 DIAGNOSIS — K50.00 CROHN'S DISEASE OF SMALL INTESTINE WITHOUT COMPLICATION: Primary | ICD-10-CM

## 2020-01-27 PROCEDURE — 99999 PR PBB SHADOW E&M-EST. PATIENT-LVL IV: ICD-10-PCS | Mod: PBBFAC,,, | Performed by: INTERNAL MEDICINE

## 2020-01-27 PROCEDURE — 99215 OFFICE O/P EST HI 40 MIN: CPT | Mod: S$GLB,,, | Performed by: INTERNAL MEDICINE

## 2020-01-27 PROCEDURE — 99999 PR PBB SHADOW E&M-EST. PATIENT-LVL IV: CPT | Mod: PBBFAC,,, | Performed by: INTERNAL MEDICINE

## 2020-01-27 PROCEDURE — 99215 PR OFFICE/OUTPT VISIT, EST, LEVL V, 40-54 MIN: ICD-10-PCS | Mod: S$GLB,,, | Performed by: INTERNAL MEDICINE

## 2020-01-27 RX ORDER — CYANOCOBALAMIN 1000 UG/ML
1000 INJECTION, SOLUTION INTRAMUSCULAR; SUBCUTANEOUS
COMMUNITY
Start: 2020-01-24 | End: 2021-08-06

## 2020-01-27 RX ORDER — NORGESTIMATE AND ETHINYL ESTRADIOL 0.25-0.035
KIT ORAL
COMMUNITY
Start: 2019-09-17 | End: 2020-03-23

## 2020-01-27 RX ORDER — DICYCLOMINE HYDROCHLORIDE 10 MG/1
10 CAPSULE ORAL
COMMUNITY
Start: 2019-08-20 | End: 2020-03-23

## 2020-01-27 RX ORDER — ONDANSETRON HYDROCHLORIDE 8 MG/1
8 TABLET, FILM COATED ORAL
COMMUNITY
Start: 2019-08-20 | End: 2020-08-19

## 2020-01-27 NOTE — PROGRESS NOTES
[facility]             Inflammatory Bowel Disease Follow-up  Note              TODAY'S VISIT DATE:  1/27/2020    Chief Complaint:   Chief Complaint   Patient presents with    Crohn's Disease       PCP: Fitz Frias    Previous History:  Angelina Castaneda is a 39 y.o. 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 She had synovectomy.  CRP January 2006 was elevated at 35.4 with ESR 38 and anemia with hemoglobin 10.7.  Symptoms remitted thereafter with methotrexate 15 mg p.o. Weekly (helped with arthropathy and no SE).  At her visit in April 2007 she did report some intermittent pain in both lateral hips.  She continued on Voltaren 75 mg twice a day and methotrexate 15 mg p.o. weekly.  She was lost to follow-up between April 2007 and November 2007 due to insurance changes. In late summer 2007 she began with recurrent pain and swelling of the left knee and by that time had not been taking methotrexate on a regular basis though had decreased the dose to 10 mg once a week.  She restart methotrexate 15 mg once a week with Voltaren 75 mg twice a day.  This seemed to remit her symptoms but not completely resolve the symptoms and therefore biologic treatments discussed and Humira recommended.  She started Humira and then was on 40 mg SC every 2 weeks while continuing methotrexate 15 mg p.o. once a week.  By 2009 she was in clinical remission with her joints and was not requiring Voltaren though had a flare up in October 2009 that coincided with her  being 10 days late for her Humira and decreasing her methotrexate from 10 mg weekly to 7.5 mg weekly.  Her symptoms went into remission with a Medrol Dosepak and increase of methotrexate 10 mg weekly while she continued on Humira 40 mg SC every 2 weeks.  In June 2010 she had progressive and recurrent pain with swelling and stiffness of the left knee and had aspiration intra-articular steroids.  In August 2010 she had return of left knee symptoms and required another Medrol Dosepak.  At that time her Humira was continued and her methotrexate was increased to 15 mg p.o. once a week.  In summary 2011 she continued to have mild anemia with slightly decreased folate and B12 levels.  Overall she was feeling well by fall of 2011 except for some mild recurrence swelling of the left knee and stiffness.  She continued on Humira 40 mg SC every 2 weeks, methotrexate 15 mg p.o. weekly with folic acid 1 mg daily.  By 2013 her methotrexate and Humira discontinued due to ineffective.  In fall 2013 she started simponi and restarted MTX 10 mg po weekly 10/2013. In December 2013 she had recurrent left knee pain and swelling and underwent aspiration and injection with Kenalog.  By May 2014 she continued on Simponi once a month and methotrexate 15 mg p.o. weekly.  In January 2015 she saw surgery for a large external hemorrhoid at which time the plan was to proceed with an external hemorrhoid excision.  Biopsies of the hemorrhoid suggested atypical epithelial change with suspicion for viral cytopathic changes and HPV negative.  Patient recurrence left knees pain and swelling in March of 2015 at which time aspiration and steroid injection was done.  At that time she had continued on Simponi monthly and methotrexate 10 mg weekly.  By April 2015 her methotrexate was increased to 17.5 mg once a week while continuing on monthly Simponi.  In early June 2015 Simponi was discontinued and patient was started on Cimzia and by June 2015 she was on  Cimzia every 2 weeks while continuing methotrexate 15 mg p.o. weekly.  In July 2015 due to recurrence symptoms of large knee inflammatory infusion she took a 2 week course of prednisone which was helpful.  She had repeat aspiration injection of steroids in July of 2015 of her left knee. After being on cimzia for about 2 mos her joint pains worsened considerably.  She started on xeljanz (unknown dose) which helped her joint pains and discontinued within a few mos and this was discontinued due to family planning in future.  She saw surgeons September 2015 again at which time she was found to have irritated anal skin with inflammation and bleeding associated with bowel movements with inflammed perianal skin tag with findings concerning for Crohn's disease. Patient saw Dr. Plunkett on 10/15/2015 and he felt that the perianal issues were more likely to be consistent with fistula.  By this time patient was on Xeljanz for her joint issues.  EGD November 2015 was normal. Colonoscopy showed severe colitis and ileitis in the ileocecal valve and unable to intubate the terminal ileum due to stricturing of the IC valve with mucosal ulcerations.  There is also granulation tissue internally like healed fissure and posterior midline and left lateral fistula granulation with prominence.  Otherwise the colon appeared normal. Patient was started on Pentasa, continued methotrexate and plans were to change from Xeljanz to Humira.  Also ciprofloxacin and Flagyl was started.  Biopsies of the duodenum were normal.  Biopsies of the IC valve showed chronic active colitis.  SBFT November 2015 showed loss of normal fold pattern in the patient's terminal ileum. Around Nov/Dec 2015 she started Humira and MTX 15 mg po weekly. She was not tolerant of Flagyl.  At her visit with Dr. Plunkett December 2015 she requested to stop methotrexate (future family planning) and finally discontinued in January 2016 while continuing Humira 40 mg SC every 2  weeks and Pentasa.  By March 2016 she was feeling well on this regimen and had minimal bleeding once or twice a week with rare mucus.  She saw Rheumatology April 2016 and May 2016 after developing right knee pain and swelling at which time aspiration injection of steroids was done.  At her visit with Rheumatology May of 2016 her rheumatologist was going to coordinate with Gastroenterology to change Pentasa to sulfasalazine to help with joint issues.  In July 2016 got . In September of 2016 she again had right knee infusion that was aspirated and steroids were injected.  She became pregnant in the end of September 2016 (delivered healthy baby girl by Csection and baby was 3 weeks early- 5/29/17) at which time she was asymptomatic from her bowel disease and at her visit on 10/21/2016 Pentasa was discontinued and she was started on Flagyl.  There is notation of getting Humira levels but but this was deferred due to patient symptomatic improvement by in November of 2016.  She continued on Humira 40 mg SC every 2 weeks as monotherapy during pregnancy and stopped at 28 weeks per recs by Dr. Plunkett. In January 2017 she had aspiration and injection of steroids into her right knee due to her current knee pain and swelling.  She saw Dr. Plunkett 3/31/2017 at which time she was 28 weeks pregnant and he was instructed to discontinue Humira.  Approximately 2 weeks after delivery she developed some abdominal cramping, arthralgia and knee infusion and pain. Her rheumatologist resumed her Humira and she had a rapid response with improve knee pain and resolution of her abdominal cramping though some rectal bleeding including drips of blood with bowel movements intermittently.  She was given Anusol HC cream for suspected perianal or hemorrhoidal bleeding and did not wish to resume Pentasa postpartum (resumed for 2 mos).  In mid September 2017 she had recurrence of right knee pain and swelling in saw her rheumatologist at  which time the knee was again aspirated and injected with steroids.  She saw Dr. Plunkett in January 2018 with stable symptoms with the plan to do a colonoscopy November 2018.  She had tried sulfasalazine for joint issues early on and sometime 2018 she was started again on sulfasalazine which was discontinued 8/2019. Bone density test 8/10/2018 was normal. She underwent a colonoscopy on 8/27/2018 for generalized abdominal pain and chronic diarrhea which showed discontinues areas of nonbleeding ulcerated mucosa at IC valve consistent with Crohn's ulcer that was injected with epinephrine due to bleeding.  She had also perianal skin tags with some some rectal tenderness an anal stricture.  There was ulcerated mucosa at the anus and in the distal rectum.  Small pedunculated polypoid lesion at the IC valve most consistent with pseudopolyps and there was a benign-appearing intrinsic severe stenosis of the IC valve that did not allow intubation of the terminal ileum. Biopsies of the IC valve showed acutely inflamed mucosal ulcer with reactive changes and the polyp was consistent with an inflammatory hyperplastic polyp.  At that time Dr. Plunkett recommended ciprofloxacin and Flagyl for 1 week, prednisone and to increase Humira to 40 mg SC weekly.  Although I do not have the Humira levels there is a note indicating that she had low levels with no antibodies when this change was made.  She tapered off of prednisone by the end of September 2018 while continuing Humira 40 mg SC weekly.  In 92018 she had a miscarriage. At her visit on 9/26/2018 with Dr. Plunkett she mentioned that she would like to get pregnant again and he wrote that he felt that if her symptoms improved during pregnancy then she can decrease her Humira at to 40 mg SC every 2 weeks.  Her symptoms did improved on Humira 40 mg SC weekly with some residual minor bloating and abdominal cramping with rare diarrhea.  In addition to the Humira she was also on  sulfasalazine with folic acid.  By April 2019 she began to family plan again and at that time she had abdominal cramping with possible partial small-bowel obstruction for 2 days with intermittent episodes as well.  Dr. Plunkett was concerned that the IC valve stricture was playing a role in the symptoms and recommended repeat Humira levels and antibodies and if adequate levels then change to Remicade given loss of response of weekly Humira.  She did become pregnant and had a miscarriage in the early summer of 2019.  Dr. Plunkett had discontinued sulfasalazine during her short pregnancy though her maternal fetal medicine doctor recommend that she can remain on it.  Dr. Plunkett saw patient on 8/12/2019 at which time she was continued on Humira SC weekly and her sulfasalazine was increased to 3 g/day.  IV iron was also being considered.  On 8/15/2019 she was seen in the GI clinic again due to fever of 101, lower abdominal pain and discomfort with bilateral suprapubic and right lower quadrant pain. She had blood work showing leukocytosis with a pregnancy test that was negative.  CT abdomen pelvis with IV and oral contrast on 8/15/2019 showed interloop abscess in the right lower quadrant with thick walls and loculation measuring 5.1 x 3.7 cm with peripheral enhancement.  This is adjacent to a very abnormal loop of terminal ileum with significant wall thickening and inflammatory changes.  There is additional edematous changes in the right lower quadrant adjacent to the cecum with mild cecal wall thickening and trace pelvic fluid though appendix was difficult to visualize due to inflammatory process.  There was no small-bowel obstruction or free intraperitoneal air.  When she saw Dr. Plunkett on 8/15/2019 she was afebrile and starting to feel somewhat better.  At that visit Dr. Plunkett recommended stopping Humira, starting Augmentin twice daily for 14 days and Flagyl for 5 days.  He recommended stopping sulfasalazine and  beginning Pentasa 4 g/day.  Labs on 8/16/2019 was significant for sodium 133 with normal creatinine, , white blood cell count 18.2, hemoglobin 11.0.  I have results from a gram stain of an abdominal abscess that showed preliminary findings of Gram-positive cocci.  Along with Candida on fungal cultures and negative anaerobic cultures with 1+ E coli.  She had a negative pregnancy test on 8/19/2019 and and normal pro calcitonin and negative C diff.  Albumin on 8/20/2019 is 2.8.  Patient hospitalized from 8/16-8/20/19 and was admitted from GI clinic when she presented with persistent RLQ abdominal pain, fever and nausea despite a few days of augmentin and flagyl.  As inpatient she was started on zosyn and flagyl and CT showed loculated abscess and she underwent IR guided abscess drainage and abscess cultures grew E coli.  Surgery and ID were consulted. 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  She was then discharged on 8/20/19 with IV ampicillin and po flagyl for 2 weeks with plan for f/u in GI clinic in 1 week and CTE the week after discharge. Patient was again hospitalized from 9/7-9/13/19 at which time it was felt that IV antibiotic treatment was unsuccessful at home and underwent laparoscopic mobilization of right colon with hand assist converted to open resection of distal 1 foot of ileum and cecum with drainage of interloop abscess on 9/10/19. Surgical path showed Crohns disease with clear margins.  She had spinal fluid leak from anesthesia procedure for surgery and had blood patch.  After surgery she recovered well overall though had perianal issues that were ongoing and increased diarrhea though this has improved. She has 1-3 soft BMs/day with some urgency, no blood in stool.  Patient was started on remicade 5 mg/kg with first dose 10/14/19, 2nd dose 10/28/19, 3rd dose 11/26/19 and started imuran 100 mg daily end of 9/2019.  She continued on folic acid 1 mg daily. She has  nausea she attributes to flagyl which she has been on since 19 (stopped 10/31/19). She takes pepcid 40 mg po BID for heartburn and about a year ago was on protonix.     Interval History:  - current IBD meds: Remicade 5 mg/kg every 8 hours (last dose , next dose 3/16), Imuran 100 mg orally daily  - 0-2 soft non-bloody BM in a day; although intermittently she reports increase urgent after lunch when she has salad or fried food  - 19 CMP with normal Cr/LFT's, CBC with normal hemoglobin, and CRP 0.10  - 19 Quant negative, Hep B surface antibody positive, Hep B core total negative  - 19 saw Dr. Plunkett and per note he recommended checking Remicade, obtaining colonoscopy at [facility] [place], and following up in clinic in 2 months  - 20 Remicade levels 11, antibodies <22, thiopurine metabolites not drawn  - NSAID use: no  - Narcotic use: no  - Alternative/complementary meds for IBD: no    Prior Pertinent Surgeries:    synovectomy  2015 Hemorrhoidectomy  2019 ileocolonic resection (righ colon removed, 1 foot of ileum removed). Surgical path showed Crohns disease with clear margins.    Pertinent Endoscopy/Imagin2015 EGD normal  2015 colonoscopy: severe colitis and ileitis in the ileocecal valve and unable to intubate the terminal ileum due to stricturing of the IC valve with mucosal ulcerations.  There is also granulation tissue internally like healed fissure and posterior midline and left lateral fistula granulation with prominence.  Otherwise the colon appeared normal  2015 SBFT: normal  18 colonoscopy: discontinuous areas of nonbleeding ulcerated mucosa at IC valve consistent with Crohn's ulcer that was injected with epinephrine due to bleeding.  She had also perianal skin tags with some some rectal tenderness an anal stricture.  There was ulcerated mucosa at the anus and in the distal rectum.  Small pedunculated polypoid lesion at the IC valve most consistent with pseudopolyps  and there was a benign-appearing intrinsic severe stenosis of the IC valve that did not allow intubation of the terminal ileum. Biopsies of the IC valve showed acutely inflamed mucosal ulcer with reactive changes and the polyp was consistent with an inflammatory hyperplastic polyp.  CT abdomen pelvis with IV and oral contrast on 8/15/2019 showed interloop abscess in the right lower quadrant with thick walls and loculation measuring 5.1 x 3.7 cm with peripheral enhancement.  This is adjacent to a very abnormal loop of terminal ileum with significant wall thickening and inflammatory changes.  There is additional edematous changes in the right lower quadrant adjacent to the cecum with mild cecal wall thickening and trace pelvic fluid though appendix was difficult to visualize due to inflammatory process.  There was no small-bowel obstruction or free intraperitoneal air.   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       Pertinent Labs:  11/1/19  HAV IgG negative  HAV IgM negative  Vitamin B12 296 (normal 211-911)  BHCG <5  HIV nonreactive  HCV Ab nonreactive  TSH 1.04 (normal)/FT4 0.91 (normal)  WBC 7.5  Hgb 11.0  Hct 36.4  pletelets 278  CRP 0.10  Na 139, K 3.7, Alb 4.2  AST 22/ALT 14/TB 0.3/ALP 50  IgA 716  TTG IgA <2     Therapeutic Drug Monitoring Labs:  Humira levels:  note indicating that she had low levels with no antibodies when this change was made.  1/7/20 12 week from start remicade level 11 with antibodies <22    Prior IBD Therapies:  Prednisone  Sulfasalazine  MTX 7.5 to 15 mg po weekly- discontinued 1/2016  Humira (started summer 2007, stopped 2013, restarted 2015, stopped 8/2019)  Simponi (10/2013-6/2015)  Cimzia (6/2015)  Xeljanz (2015)  Pentasa (2015)    Vaccinations:  Influenza (inactive):  09/2019, due in 2020  Pneumococcal PCV 13: recommended  Pneumococcal PCV 23: recommended 2-12  mos after prevnar 13  Tetanus (TdaP): recommended every 10 years  HPV (males and females ages 19-27 yo):    Not sure  Meningococcal: UTD with childhood vaccines  Hepatitis B:  immune 1/2012  Hepatitis A:  recommended  MMR (live vaccine):  UTD with childhood vaccines   Chickenpox status/Varicella (live vaccine): VZV IgG positive 2007, immune  Shingrix: recommended after age 49 yo due to current shortage    Review of Systems   Constitutional: Negative for chills, fever and weight loss.   Eyes: Negative for pain and redness.   Respiratory: Negative for cough and shortness of breath.    Cardiovascular: Negative for chest pain.   Gastrointestinal: Positive for heartburn. Negative for abdominal pain, nausea and vomiting.   Genitourinary: Negative for dysuria and hematuria.   Musculoskeletal: Negative for back pain.   Skin: Negative for rash.   Psychiatric/Behavioral: Negative for depression. The patient is not nervous/anxious.      All Medical History/Surgical History/Family History/Social History/Allergies have been reviewed and updated in EMR    Review of patient's allergies indicates:  No Known Allergies    Outpatient Medications Marked as Taking for the 1/27/20 encounter (Office Visit) with Arthur Vazquez MD   Medication Sig Dispense Refill    azaTHIOprine (IMURAN) 50 mg Tab Take 100 mg by mouth once daily.      cholecalciferol, vitamin D3, 5,000 unit capsule Take 5,000 Units by mouth once daily.      cyanocobalamin 1,000 mcg/mL injection Inject 1,000 mcg into the muscle every 30 days.       dicyclomine (BENTYL) 10 MG capsule Take 10 mg by mouth.      famotidine (PEPCID) 40 MG tablet Take 40 mg by mouth 2 (two) times daily.      folic acid (FOLVITE) 1 MG tablet Take 1 mg by mouth once daily.      infliximab (REMICADE IV) 5 mg/kg.       iron-vitamin C 100-250 mg, ICAR-C, 100-250 mg Tab TAKE 1 TABLET EVERY DAY      norgestimate-ethinyl estradiol (SPRINTEC, 28,) 0.25-35 mg-mcg per tablet       ondansetron  "(ZOFRAN) 8 MG tablet Take 8 mg by mouth.      prenatal comb no.42/folic acid (PRENA1 CHEW ORAL)       prenatal vit-iron fum-folic ac (RIGHT STEP PRENATAL VITAMINS) 27 mg iron- 0.8 mg Tab Take 1 tablet by mouth.         Vital Signs:  /70 (BP Location: Left arm, Patient Position: Sitting)   Pulse 68   Ht 5' 6" (1.676 m)   Wt 62.2 kg (137 lb 2 oz)   SpO2 100%   BMI 22.13 kg/m²      Physical Exam   Constitutional: She is oriented to person, place, and time. No distress.   HENT:   Head: Normocephalic and atraumatic.   Eyes: No scleral icterus.   Cardiovascular: Normal rate and regular rhythm.   Pulmonary/Chest: Effort normal and breath sounds normal.   Abdominal: Soft. Bowel sounds are normal. She exhibits no distension. There is no tenderness.   Well healed abdominal scar   Musculoskeletal: She exhibits no edema.   Neurological: She is alert and oriented to person, place, and time.   Skin: She is not diaphoretic.   Nursing note and vitals reviewed.      Labs:   No results found for: WBC, HGB, HCT, MCV, PLT  No results found for: CREATININE, ALBUMIN, BILITOT, ALKPHOS, AST, ALT, GGT    Assessment/Plan:  Angelina Castaneda is a 39 y.o. 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  By the time she was seen in IBD clinic on 10/31/19 she was doing well post-surgery on Remicade and Imuran. She subsequently had  12 week proactive remicade levels which were 11 with antibodies <22. Moving forward we plan to check thiopurine metabolites this week, obtain a colonoscopy in early March, and remicade trough levels on 3/16 before she receives her next infusion. We would like to see her back in clinic at the end of March to not only " review labs/scope/symptoms but also discuss family planning in depth as patient desires to become pregnant again.    # Crohn's disease (ileal/ICV, perianal skin tags/anal stricture, s/p ileal/right colon resection due to intrabdominal abscess 9/2019)  - continue remicade 5 mg/kg every 8 weeks   - continue imuran 100 mg po daily   - vitamin B12 deficiency: B12 296 on 11/1/19 therefore recommend B12 shots monthly which patient will arrange back home  - colonoscopy 3/3/20  - CRC risk: sx onset 2015, ileal/ICV/perianal, average risk  - basic labs: LFT's this week   - drug monitoring labs: CBC/LFTs every 3 months (due March 2020), TPMT (normal 9/2019), TB quantiferon (negative 11/2019, repeat 11/2020), Hep B testing (HBsAg negative 6/2019- repeat 06/2020; HBsAb positive, HBcoreAb negative 11/2019-repeat 11/2020)  - TDM: thiopurine metabolites this week, trough remicade level on 3/16/20 before next infusion    # Family planning:   - wants to get pregnant asap  - will see patient soon after colonoscopy to make sure we can let her get pregnant and advise on safety of meds during pregnancy as well as other precautions    # 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 with reecurrent episodes of right knee swelling/effusion requiring aspiration, steroid injections  - inactive and remicade likely helping; also IBD in remission which is also helping    # GERD well controlled on pepcid BID however patient reports that medication is on back order  - start Tagamet    # IBD specific health maintenance:  Eye exam yearly  Skin exam yearly  Risk for osteopenia/osteoporosis- none  Pap smear yearly due to chronic immunosuppression also important as she had prior abnormal pap smears  Vitamin D- ordered level, advised to decrease from 5,000 IU to 1,000 IU/day  Vaccines: no live vaccine, list of recommended vaccines given to patient- recommend flu shot yearly, prevnar 13 and then 2-12 mos later pneumovax 23,  tetanus every 10 years    Follow up: Dr. Vazquez/Dr. Forman at the end of March    Josh Forman M.D.  Gastroenterology Fellow, PGY-VI  Pager: [phone]  [facility]      I have reviewed and concur with the fellow's history, physical, assessment, and plan.  I have personally interviewed and examined the patient. The above note has been edited to reflect my recommendations.     Arthur Vazquez MD   Department of Gastroenterology  Medical Director, Inflammatory Bowel Disease

## 2020-01-27 NOTE — PATIENT INSTRUCTIONS
Instructions:  - LFTs, vitamin D, and thiopurine metabolites- get these done this week- give pt lab slip  - on 3/16 before your infusion- repeat remicade levels/abs- give pt lab slip  - make sure we get results  - recommend vitamin B12 shot once a month  - skin and eye exam yearly  - pap smear yearly  - vaccines-no live vaccines, follow up with PCP for Prevnar, Pneumovax, Hep A  - colonoscopy on 3/3/20  - follow up in end of March with Dr. Vazquez/Dr. Forman

## 2020-02-07 ENCOUNTER — PATIENT MESSAGE (OUTPATIENT)
Dept: ENDOSCOPY | Facility: HOSPITAL | Age: 40
End: 2020-02-07

## 2020-02-10 ENCOUNTER — PATIENT MESSAGE (OUTPATIENT)
Dept: GASTROENTEROLOGY | Facility: CLINIC | Age: 40
End: 2020-02-10

## 2020-02-10 ENCOUNTER — TELEPHONE (OUTPATIENT)
Dept: GASTROENTEROLOGY | Facility: CLINIC | Age: 40
End: 2020-02-10

## 2020-02-10 NOTE — TELEPHONE ENCOUNTER
Outside Labs: [facility]     Date of labs: 1/31/2020    Results:    6-TNG  258 pmol/8x10E8    6-MMPN  209  Pmol/8X10E8          NEXT SCHEDULED APPOINTMENT:

## 2020-02-28 ENCOUNTER — PATIENT MESSAGE (OUTPATIENT)
Dept: ENDOSCOPY | Facility: HOSPITAL | Age: 40
End: 2020-02-28

## 2020-02-28 DIAGNOSIS — Z12.11 SPECIAL SCREENING FOR MALIGNANT NEOPLASMS, COLON: Primary | ICD-10-CM

## 2020-02-28 RX ORDER — POLYETHYLENE GLYCOL 3350, SODIUM SULFATE ANHYDROUS, SODIUM BICARBONATE, SODIUM CHLORIDE, POTASSIUM CHLORIDE 236; 22.74; 6.74; 5.86; 2.97 G/4L; G/4L; G/4L; G/4L; G/4L
4 POWDER, FOR SOLUTION ORAL ONCE
Qty: 4000 ML | Refills: 0 | Status: SHIPPED | OUTPATIENT
Start: 2020-02-28 | End: 2020-02-28

## 2020-02-28 NOTE — TELEPHONE ENCOUNTER
Spoke with patient and reviewed Colonoscopy bowel prep instructions. Patient verbalized understanding.

## 2020-02-28 NOTE — TELEPHONE ENCOUNTER
Called patient regarding bowel prep and instructions. No answer. Left message with my direct contact number and also informed patient that her Golytely prescription was sent into her pharmacy, [facility] in [place], [place]. Instructed patient to contact me to review her bowel prep instructions.

## 2020-03-03 ENCOUNTER — ANESTHESIA (OUTPATIENT)
Dept: ENDOSCOPY | Facility: HOSPITAL | Age: 40
End: 2020-03-03
Payer: COMMERCIAL

## 2020-03-03 ENCOUNTER — HOSPITAL ENCOUNTER (OUTPATIENT)
Facility: HOSPITAL | Age: 40
Discharge: HOME | End: 2020-03-03
Attending: INTERNAL MEDICINE | Admitting: INTERNAL MEDICINE
Payer: COMMERCIAL

## 2020-03-03 ENCOUNTER — ANESTHESIA EVENT (OUTPATIENT)
Dept: ENDOSCOPY | Facility: HOSPITAL | Age: 40
End: 2020-03-03
Payer: COMMERCIAL

## 2020-03-03 VITALS
BODY MASS INDEX: 21.05 KG/M2 | HEART RATE: 59 BPM | SYSTOLIC BLOOD PRESSURE: 98 MMHG | OXYGEN SATURATION: 100 % | TEMPERATURE: 98 F | HEIGHT: 66 IN | RESPIRATION RATE: 20 BRPM | DIASTOLIC BLOOD PRESSURE: 53 MMHG | WEIGHT: 131 LBS

## 2020-03-03 LAB
B-HCG UR QL: NEGATIVE
CTP QC/QA: YES

## 2020-03-03 PROCEDURE — 27201012 HC FORCEPS, HOT/COLD, DISP: Performed by: INTERNAL MEDICINE

## 2020-03-03 PROCEDURE — 88305 TISSUE EXAM BY PATHOLOGIST: CPT | Mod: 26,,, | Performed by: PATHOLOGY

## 2020-03-03 PROCEDURE — 63600175 PHARM REV CODE 636 W HCPCS: Performed by: NURSE ANESTHETIST, CERTIFIED REGISTERED

## 2020-03-03 PROCEDURE — 37000008 HC ANESTHESIA 1ST 15 MINUTES: Performed by: INTERNAL MEDICINE

## 2020-03-03 PROCEDURE — 81025 URINE PREGNANCY TEST: CPT | Performed by: INTERNAL MEDICINE

## 2020-03-03 PROCEDURE — 63600175 PHARM REV CODE 636 W HCPCS: Performed by: INTERNAL MEDICINE

## 2020-03-03 PROCEDURE — 37000009 HC ANESTHESIA EA ADD 15 MINS: Performed by: INTERNAL MEDICINE

## 2020-03-03 PROCEDURE — 88305 TISSUE EXAM BY PATHOLOGIST: CPT | Performed by: PATHOLOGY

## 2020-03-03 PROCEDURE — 45380 COLONOSCOPY AND BIOPSY: CPT | Performed by: INTERNAL MEDICINE

## 2020-03-03 PROCEDURE — 45380 COLONOSCOPY AND BIOPSY: CPT | Mod: ,,, | Performed by: INTERNAL MEDICINE

## 2020-03-03 PROCEDURE — 88305 TISSUE EXAM BY PATHOLOGIST: ICD-10-PCS | Mod: 26,,, | Performed by: PATHOLOGY

## 2020-03-03 PROCEDURE — E9220 PRA ENDO ANESTHESIA: ICD-10-PCS | Mod: ,,, | Performed by: NURSE ANESTHETIST, CERTIFIED REGISTERED

## 2020-03-03 PROCEDURE — 45380 PR COLONOSCOPY,BIOPSY: ICD-10-PCS | Mod: ,,, | Performed by: INTERNAL MEDICINE

## 2020-03-03 PROCEDURE — E9220 PRA ENDO ANESTHESIA: HCPCS | Mod: ,,, | Performed by: NURSE ANESTHETIST, CERTIFIED REGISTERED

## 2020-03-03 RX ORDER — PROPOFOL 10 MG/ML
VIAL (ML) INTRAVENOUS CONTINUOUS PRN
Status: DISCONTINUED | OUTPATIENT
Start: 2020-03-03 | End: 2020-03-03

## 2020-03-03 RX ORDER — PROPOFOL 10 MG/ML
VIAL (ML) INTRAVENOUS
Status: DISCONTINUED | OUTPATIENT
Start: 2020-03-03 | End: 2020-03-03

## 2020-03-03 RX ORDER — LIDOCAINE HYDROCHLORIDE 20 MG/ML
INJECTION INTRAVENOUS
Status: DISCONTINUED | OUTPATIENT
Start: 2020-03-03 | End: 2020-03-03

## 2020-03-03 RX ORDER — SODIUM CHLORIDE 9 MG/ML
INJECTION, SOLUTION INTRAVENOUS CONTINUOUS
Status: DISCONTINUED | OUTPATIENT
Start: 2020-03-03 | End: 2020-03-03 | Stop reason: HOSPADM

## 2020-03-03 RX ADMIN — SODIUM CHLORIDE: 0.9 INJECTION, SOLUTION INTRAVENOUS at 02:03

## 2020-03-03 RX ADMIN — PROPOFOL 30 MG: 10 INJECTION, EMULSION INTRAVENOUS at 02:03

## 2020-03-03 RX ADMIN — LIDOCAINE HYDROCHLORIDE 50 MG: 20 INJECTION, SOLUTION INTRAVENOUS at 02:03

## 2020-03-03 RX ADMIN — PROPOFOL 70 MG: 10 INJECTION, EMULSION INTRAVENOUS at 02:03

## 2020-03-03 RX ADMIN — PROPOFOL 200 MCG/KG/MIN: 10 INJECTION, EMULSION INTRAVENOUS at 02:03

## 2020-03-03 NOTE — DISCHARGE INSTRUCTIONS
Colonoscopy     A camera attached to a flexible tube with a viewing lens is used to take video pictures.     Colonoscopy is a test to view the inside of your lower digestive tract (colon and rectum). Sometimes it can show the last part of the small intestine (ileum). During the test, small pieces of tissue may be removed for testing. This is called a biopsy. Small growths, such as polyps, may also be removed.   Why is colonoscopy done?  The test is done to help look for colon cancer. And it can help find the source of abdominal pain, bleeding, and changes in bowel habits. It may be needed once a year, depending on factors such as your:  · Age  · Health history  · Family health history  · Symptoms  · Results from any prior colonoscopy  Risks and possible complications  These include:  · Bleeding               · A puncture or tear in the colon   · Risks of anesthesia  · A cancer lesion not being seen  Getting ready   To prepare for the test:  · Talk with your healthcare provider about the risks of the test (see below). Also ask your healthcare provider about alternatives to the test.  · Tell your healthcare provider about any medicines you take. Also tell him or her about any health conditions you may have.  · Make sure your rectum and colon are empty for the test. Follow the diet and bowel prep instructions exactly. If you dont, the test may need to be rescheduled.  · Plan for a friend or family member to drive you home after the test.     Colonoscopy provides an inside view of the entire colon.     You may discuss the results with your doctor right away or at a future visit.  During the test   The test is usually done in the hospital on an outpatient basis. This means you go home the same day. The procedure takes about 30 minutes. During that time:  · You are given relaxing (sedating) medicine through an IV line. You may be drowsy, or fully asleep.  · The healthcare provider will first give you a physical exam to  check for anal and rectal problems.  · Then the anus is lubricated and the scope inserted.  · If you are awake, you may have a feeling similar to needing to have a bowel movement. You may also feel pressure as air is pumped into the colon. Its OK to pass gas during the procedure.  · Biopsy, polyp removal, or other treatments may be done during the test.  After the test   You may have gas right after the test. It can help to try to pass it to help prevent later bloating. Your healthcare provider may discuss the results with you right away. Or you may need to schedule a follow-up visit to talk about the results. After the test, you can go back to your normal eating and other activities. You may be tired from the sedation and need to rest for a few hours.  Date Last Reviewed: 11/1/2016 © 2000-2017 [employer]. [address] [place] [address], [place] [address]. All rights reserved. This information is not intended as a substitute for professional medical care. Always follow your healthcare professional's instructions.

## 2020-03-03 NOTE — ANESTHESIA PREPROCEDURE EVALUATION
03/03/2020  Angelina Castaneda is a 39 y.o., female.    Anesthesia Evaluation    I have reviewed the Patient Summary Reports.    I have reviewed the Nursing Notes.   I have reviewed the Medications.     Review of Systems  Anesthesia Hx:  No problems with previous Anesthesia Denies Hx of Anesthetic complications    Hematology/Oncology:  Hematology Normal   Oncology Normal     EENT/Dental:EENT/Dental Normal   Cardiovascular:  Cardiovascular Normal     Pulmonary:  Pulmonary Normal    Renal/:  Renal/ Normal     Hepatic/GI:   GERD    Musculoskeletal:  Musculoskeletal Normal    Neurological:  Neurology Normal    Endocrine:  Endocrine Normal    Dermatological:  Skin Normal    Psych:  Psychiatric Normal           Physical Exam  General:  Well nourished    Airway/Jaw/Neck:  Airway Findings: Mouth Opening: Normal General Airway Assessment: Adult  Mallampati: II  TM Distance: Normal, at least 6 cm  Jaw/Neck Findings:  Neck ROM: Normal ROM     Eyes/Ears/Nose:  EYES/EARS/NOSE FINDINGS: Normal   Dental:  Dental Findings: In tact   Chest/Lungs:  Chest/Lungs Clear    Heart/Vascular:  Heart Findings: Normal Heart murmur: negative Vascular Findings: Normal    Abdomen:  Abdomen Findings: Normal    Musculoskeletal:  Musculoskeletal Findings: Normal   Skin:  Skin Findings: Normal    Mental Status:  Mental Status Findings: Normal        Anesthesia Plan  Type of Anesthesia, risks & benefits discussed:  Anesthesia Type:  general  Patient's Preference: General  Intra-op Monitoring Plan: standard ASA monitors  Intra-op Monitoring Plan Comments:   Post Op Pain Control Plan:   Post Op Pain Control Plan Comments:   Induction:   IV  Beta Blocker:  Patient is not currently on a Beta-Blocker (No further documentation required).       Informed Consent: Patient understands risks and agrees with Anesthesia plan.  Questions answered.  Anesthesia consent signed with patient.  ASA Score: 1     Day of Surgery Review of History & Physical: I have interviewed and examined the patient. I have reviewed the patient's H&P dated:  There are no significant changes.  H&P update referred to the surgeon.         Ready For Surgery From Anesthesia Perspective.

## 2020-03-03 NOTE — [employer] PATIENT INSTRUCTIONS
Discharge Summary/Instructions after an Endoscopic Procedure  Patient Name: Angelina Castaneda  Patient MRN: [id number]  Patient YOB: 1980  Tuesday, March 03, 2020  Arthur Vazquez MD  RESTRICTIONS:  During your procedure today, you received medications for sedation.  These   medications may affect your judgment, balance and coordination.  Therefore,   for 24 hours, you have the following restrictions:   - DO NOT drive a car, operate machinery, make legal/financial decisions,   sign important papers or drink alcohol.    ACTIVITY:  Today: no heavy lifting, straining or running due to procedural   sedation/anesthesia.  The following day: return to full activity including work.  DIET:  Eat and drink normally unless instructed otherwise.     TREATMENT FOR COMMON SIDE EFFECTS:  - Mild abdominal pain, nausea, belching, bloating or excessive gas:  rest,   eat lightly and use a heating pad.  - Sore Throat: treat with throat lozenges and/or gargle with warm salt   water.  - Because air was used during the procedure, expelling large amounts of air   from your rectum or belching is normal.  - If a bowel prep was taken, you may not have a bowel movement for 1-3 days.    This is normal.  SYMPTOMS TO WATCH FOR AND REPORT TO YOUR PHYSICIAN:  1. Abdominal pain or bloating, other than gas cramps.  2. Chest pain.  3. Back pain.  4. Signs of infection such as: chills or fever occurring within 24 hours   after the procedure.  5. Rectal bleeding, which would show as bright red, maroon, or black stools.   (A tablespoon of blood from the rectum is not serious, especially if   hemorrhoids are present.)  6. Vomiting.  7. Weakness or dizziness.  GO DIRECTLY TO THE NEAREST EMERGENCY ROOM IF YOU HAVE ANY OF THE FOLLOWING:      Difficulty breathing              Chills and/or fever over 101 F   Persistent vomiting and/or vomiting blood   Severe abdominal pain   Severe chest pain   Black, tarry stools   Bleeding- more than one  tablespoon   Any other symptom or condition that you feel may need urgent attention  Your doctor recommends these additional instructions:  If any biopsies were taken, your doctors clinic will contact you in 1 to 2   weeks with any results.  - Discharge patient to home.   - Patient has a contact number available for emergencies.  The signs and   symptoms of potential delayed complications were discussed with the   patient.  Return to normal activities tomorrow.  Written discharge   instructions were provided to the patient.   - Resume previous diet.   - Continue present medications.   - Await pathology results.   - Return to referring physician.   - Repeat colonoscopy in 1 year.   - Repeat colonoscopy in 1 year for surveillance.  For questions, problems or results please call your physician - Arthur Vazquez MD at Work:  [phone].  [facility], EMERGENCY ROOM PHONE NUMBER: [phone]  IF A COMPLICATION OR EMERGENCY SITUATION ARISES AND YOU ARE UNABLE TO REACH   YOUR PHYSICIAN - GO DIRECTLY TO THE EMERGENCY ROOM.  Arthur Vazquez MD  3/3/2020 3:22:13 PM  This report has been verified and signed electronically.  [employer]

## 2020-03-03 NOTE — H&P
Short Stay Endoscopy History and Physical    PCP - Fitz Frias MD    Procedure - Colonoscopy  ASA - per anesthesia  Mallampati - per anesthesia  History of Anesthesia problems - no  Family history Anesthesia problems - no   Plan of anesthesia - General    HPI:  39 year old female with a history of Crohn's disease who presents for a colonoscopy.    ROS:  Constitutional: No fevers, chills, No weight loss  CV: No chest pain  Pulm: No cough, No shortness of breath  GI: see HPI  Derm: No rash    Medical History:  has a past medical history of Arthritis, Crohn's disease, GERD (gastroesophageal reflux disease), Immunosuppressed status (2019), and Inflammatory bowel disease.    Surgical History:  has a past surgical history that includes Bowel resection (09/10/2019); Augmentation of breast (); Fistula Repair ();  section (); Montchanin tooth extraction; Appendectomy; dilation and curretage (2019); and Small intestine surgery.    Family History: family history includes COPD in her maternal grandfather and paternal grandfather; Hypertension in her father, mother, and paternal grandfather; Irritable bowel syndrome in her maternal grandmother; Psoriasis in her father.. Otherwise no colon cancer, inflammatory bowel disease, or GI malignancies.    Social History:  reports that she has never smoked. She has never used smokeless tobacco. She reports that she drinks about 3.0 standard drinks of alcohol per week. She reports that she does not use drugs.    Review of patient's allergies indicates:  No Known Allergies    Medications:   Medications Prior to Admission   Medication Sig Dispense Refill Last Dose    azaTHIOprine (IMURAN) 50 mg Tab Take 100 mg by mouth once daily.   3/2/2020 at Unknown time    cholecalciferol, vitamin D3, 5,000 unit capsule Take 5,000 Units by mouth once daily.   3/2/2020 at Unknown time    iron-vitamin C 100-250 mg, ICAR-C, 100-250 mg Tab TAKE 1 TABLET EVERY DAY    3/2/2020 at Unknown time    ondansetron (ZOFRAN) 8 MG tablet Take 8 mg by mouth.   3/2/2020 at Unknown time    prenatal comb no.42/folic acid (PRENA1 CHEW ORAL)    3/2/2020 at Unknown time    prenatal vit-iron fum-folic ac (RIGHT STEP PRENATAL VITAMINS) 27 mg iron- 0.8 mg Tab Take 1 tablet by mouth.   3/2/2020 at Unknown time    cyanocobalamin 1,000 mcg/mL injection Inject 1,000 mcg into the muscle every 30 days.    More than a month at Unknown time    dicyclomine (BENTYL) 10 MG capsule Take 10 mg by mouth.   More than a month at Unknown time    famotidine (PEPCID) 40 MG tablet Take 40 mg by mouth 2 (two) times daily.   More than a month at Unknown time    folic acid (FOLVITE) 1 MG tablet Take 1 mg by mouth once daily.   More than a month at Unknown time    infliximab (REMICADE IV) 5 mg/kg.    More than a month at Unknown time    norgestimate-ethinyl estradiol (SPRINTEC, 28,) 0.25-35 mg-mcg per tablet    More than a month at Unknown time         Physical Exam:    Vital Signs:   Vitals:    03/03/20 1416   BP: (!) 93/56   Pulse: 62   Resp: 16   Temp: 98.2 °F (36.8 °C)       General Appearance: Well appearing in no acute distress  Eyes:    No scleral icterus  ENT: Neck supple, Lips, mucosa, and tongue normal; teeth and gums normal  Lungs: CTA bilaterally  Heart:  S1, S2 normal, no murmurs heard  Abdomen: Soft, non tender, non distended with positive bowel sounds. No hepatosplenomegaly, ascites, or mass.  Extremities: 2+ pulses, no clubbing, cyanosis or edema  Skin: No rash      Labs:  No results found for: WBC, HGB, HCT, PLT, CHOL, TRIG, HDL, LDLDIRECT, ALT, AST, NA, K, CL, CREATININE, BUN, CO2, TSH, PSA, INR, GLUF, HGBA1C, MICROALBUR    I have explained the risks and benefits of endoscopy procedures to the patient including but not limited to bleeding, perforation, infection, and death.    Josh Forman M.D.  Gastroenterology Fellow, PGY-VI  Pager: [phone]  [facility]

## 2020-03-03 NOTE — TRANSFER OF CARE
"Anesthesia Transfer of Care Note    Patient: Angelina Castaneda    Procedure(s) Performed: Procedure(s) (LRB):  COLONOSCOPY (N/A)    Patient location: PACU    Anesthesia Type: general    Transport from OR: Transported from OR on room air with adequate spontaneous ventilation    Post pain: adequate analgesia    Post assessment: no apparent anesthetic complications and tolerated procedure well    Post vital signs: stable    Level of consciousness: awake, alert and oriented    Nausea/Vomiting: no nausea/vomiting    Complications: none    Transfer of care protocol was followed      Last vitals:   Visit Vitals  BP (!) 94/51   Pulse 75   Temp 36.6 °C (97.8 °F)   Resp 15   Ht 5' 6" (1.676 m)   Wt 59.4 kg (131 lb)   LMP 02/11/2020   SpO2 100%   Breastfeeding? No   BMI 21.14 kg/m²     "

## 2020-03-04 NOTE — ANESTHESIA POSTPROCEDURE EVALUATION
Anesthesia Post Evaluation    Patient: Angelina Castaneda    Procedure(s) Performed: Procedure(s) (LRB):  COLONOSCOPY (N/A)    Final Anesthesia Type: general    Patient location during evaluation: GI PACU  Patient participation: Yes- Able to Participate  Level of consciousness: awake and alert and oriented  Post-procedure vital signs: reviewed and stable  Pain management: adequate  Airway patency: patent    PONV status at discharge: No PONV  Anesthetic complications: no      Cardiovascular status: blood pressure returned to baseline and hemodynamically stable  Respiratory status: unassisted, spontaneous ventilation and room air  Hydration status: euvolemic  Follow-up not needed.          Vitals Value Taken Time   BP 98/53 3/3/2020  3:52 PM   Temp 36.6 °C (97.8 °F) 3/3/2020  3:25 PM   Pulse 59 3/3/2020  3:52 PM   Resp 20 3/3/2020  3:52 PM   SpO2 100 % 3/3/2020  3:52 PM         Event Time     Out of Recovery 16:11:16          Pain/Rafa Score: Rafa Score: 10 (3/3/2020  3:26 PM)

## 2020-03-05 ENCOUNTER — PATIENT MESSAGE (OUTPATIENT)
Dept: GASTROENTEROLOGY | Facility: CLINIC | Age: 40
End: 2020-03-05

## 2020-03-05 LAB
FINAL PATHOLOGIC DIAGNOSIS: NORMAL
GROSS: NORMAL

## 2020-03-10 ENCOUNTER — TELEPHONE (OUTPATIENT)
Dept: ENDOSCOPY | Facility: HOSPITAL | Age: 40
End: 2020-03-10

## 2020-03-13 ENCOUNTER — PATIENT MESSAGE (OUTPATIENT)
Dept: GASTROENTEROLOGY | Facility: CLINIC | Age: 40
End: 2020-03-13

## 2020-03-15 ENCOUNTER — PATIENT MESSAGE (OUTPATIENT)
Dept: GASTROENTEROLOGY | Facility: CLINIC | Age: 40
End: 2020-03-15

## 2020-03-16 ENCOUNTER — PATIENT MESSAGE (OUTPATIENT)
Dept: GASTROENTEROLOGY | Facility: CLINIC | Age: 40
End: 2020-03-16

## 2020-03-19 ENCOUNTER — TELEPHONE (OUTPATIENT)
Dept: GASTROENTEROLOGY | Facility: CLINIC | Age: 40
End: 2020-03-19

## 2020-03-23 ENCOUNTER — OFFICE VISIT (OUTPATIENT)
Dept: GASTROENTEROLOGY | Facility: CLINIC | Age: 40
End: 2020-03-23
Payer: COMMERCIAL

## 2020-03-23 DIAGNOSIS — D84.9 IMMUNOSUPPRESSED STATUS: Primary | ICD-10-CM

## 2020-03-23 DIAGNOSIS — M12.80: ICD-10-CM

## 2020-03-23 DIAGNOSIS — K50.00 CROHN'S DISEASE OF SMALL INTESTINE WITHOUT COMPLICATION: ICD-10-CM

## 2020-03-23 PROCEDURE — 99213 PR OFFICE/OUTPT VISIT, EST, LEVL III, 20-29 MIN: ICD-10-PCS | Mod: 95,,, | Performed by: INTERNAL MEDICINE

## 2020-03-23 PROCEDURE — 99213 OFFICE O/P EST LOW 20 MIN: CPT | Mod: 95,,, | Performed by: INTERNAL MEDICINE

## 2020-03-23 NOTE — PROGRESS NOTES
[facility]             Inflammatory Bowel Disease Telemedicine Follow-up  Note               TODAY'S VISIT DATE:  3/23/2020    Chief Complaint:   Chief Complaint   Patient presents with    Crohn's Disease     PCP: Fitz Frias    Previous History:  Angelina Castaneda is a 40 y.o. 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 She had synovectomy.  CRP January 2006 was elevated at 35.4 with ESR 38 and anemia with hemoglobin 10.7.  Symptoms remitted thereafter with methotrexate 15 mg p.o. Weekly (helped with arthropathy and no SE).  At her visit in April 2007 she did report some intermittent pain in both lateral hips.  She continued on Voltaren 75 mg twice a day and methotrexate 15 mg p.o. weekly.  She was lost to follow-up between April 2007 and November 2007 due to insurance changes. In late summer 2007 she began with recurrent pain and swelling of the left knee and by that time had not been taking methotrexate on a regular basis though had decreased the dose to 10 mg once a week.  She restart methotrexate 15 mg once a week with Voltaren 75 mg twice a day.  This seemed to remit her symptoms but not completely resolve the symptoms and therefore biologic treatments discussed and Humira recommended.  She started Humira and then was on 40 mg SC every 2 weeks while continuing methotrexate 15 mg p.o. once a week.  By 2009 she was in clinical remission with her joints and was not requiring Voltaren though had a flare up in October 2009 that coincided  with her being 10 days late for her Humira and decreasing her methotrexate from 10 mg weekly to 7.5 mg weekly.  Her symptoms went into remission with a Medrol Dosepak and increase of methotrexate 10 mg weekly while she continued on Humira 40 mg SC every 2 weeks.  In June 2010 she had progressive and recurrent pain with swelling and stiffness of the left knee and had aspiration intra-articular steroids.  In August 2010 she had return of left knee symptoms and required another Medrol Dosepak.  At that time her Humira was continued and her methotrexate was increased to 15 mg p.o. once a week.  In summary 2011 she continued to have mild anemia with slightly decreased folate and B12 levels.  Overall she was feeling well by fall of 2011 except for some mild recurrence swelling of the left knee and stiffness.  She continued on Humira 40 mg SC every 2 weeks, methotrexate 15 mg p.o. weekly with folic acid 1 mg daily.  By 2013 her methotrexate and Humira discontinued due to ineffective.  In fall 2013 she started simponi and restarted MTX 10 mg po weekly 10/2013. In December 2013 she had recurrent left knee pain and swelling and underwent aspiration and injection with Kenalog.  By May 2014 she continued on Simponi once a month and methotrexate 15 mg p.o. weekly.  In January 2015 she saw surgery for a large external hemorrhoid at which time the plan was to proceed with an external hemorrhoid excision.  Biopsies of the hemorrhoid suggested atypical epithelial change with suspicion for viral cytopathic changes and HPV negative.  Patient recurrence left knees pain and swelling in March of 2015 at which time aspiration and steroid injection was done.  At that time she had continued on Simponi monthly and methotrexate 10 mg weekly.  By April 2015 her methotrexate was increased to 17.5 mg once a week while continuing on monthly Simponi.  In early June 2015 Simponi was discontinued and patient was started on Cimzia and by June 2015  she was on Cimzia every 2 weeks while continuing methotrexate 15 mg p.o. weekly.  In July 2015 due to recurrence symptoms of large knee inflammatory infusion she took a 2 week course of prednisone which was helpful.  She had repeat aspiration injection of steroids in July of 2015 of her left knee. After being on cimzia for about 2 mos her joint pains worsened considerably.  She started on xeljanz (unknown dose) which helped her joint pains and discontinued within a few mos and this was discontinued due to family planning in future.  She saw surgeons September 2015 again at which time she was found to have irritated anal skin with inflammation and bleeding associated with bowel movements with inflammed perianal skin tag with findings concerning for Crohn's disease. Patient saw Dr. Plunkett on 10/15/2015 and he felt that the perianal issues were more likely to be consistent with fistula.  By this time patient was on Xeljanz for her joint issues.  EGD November 2015 was normal. Colonoscopy showed severe colitis and ileitis in the ileocecal valve and unable to intubate the terminal ileum due to stricturing of the IC valve with mucosal ulcerations.  There is also granulation tissue internally like healed fissure and posterior midline and left lateral fistula granulation with prominence.  Otherwise the colon appeared normal. Patient was started on Pentasa, continued methotrexate and plans were to change from Xeljanz to Humira.  Also ciprofloxacin and Flagyl was started.  Biopsies of the duodenum were normal.  Biopsies of the IC valve showed chronic active colitis.  SBFT November 2015 showed loss of normal fold pattern in the patient's terminal ileum. Around Nov/Dec 2015 she started Humira and MTX 15 mg po weekly. She was not tolerant of Flagyl.  At her visit with Dr. Plunkett December 2015 she requested to stop methotrexate (future family planning) and finally discontinued in January 2016 while continuing Humira 40 mg SC  every 2 weeks and Pentasa.  By March 2016 she was feeling well on this regimen and had minimal bleeding once or twice a week with rare mucus.  She saw Rheumatology April 2016 and May 2016 after developing right knee pain and swelling at which time aspiration injection of steroids was done.  At her visit with Rheumatology May of 2016 her rheumatologist was going to coordinate with Gastroenterology to change Pentasa to sulfasalazine to help with joint issues.  In July 2016 got . In September of 2016 she again had right knee infusion that was aspirated and steroids were injected.  She became pregnant in the end of September 2016 (delivered healthy baby girl by Csection and baby was 3 weeks early- 5/29/17) at which time she was asymptomatic from her bowel disease and at her visit on 10/21/2016 Pentasa was discontinued and she was started on Flagyl.  There is notation of getting Humira levels but but this was deferred due to patient symptomatic improvement by in November of 2016.  She continued on Humira 40 mg SC every 2 weeks as monotherapy during pregnancy and stopped at 28 weeks per recs by Dr. Plunkett. In January 2017 she had aspiration and injection of steroids into her right knee due to her current knee pain and swelling.  She saw Dr. Plunkett 3/31/2017 at which time she was 28 weeks pregnant and he was instructed to discontinue Humira.  Approximately 2 weeks after delivery she developed some abdominal cramping, arthralgia and knee infusion and pain. Her rheumatologist resumed her Humira and she had a rapid response with improve knee pain and resolution of her abdominal cramping though some rectal bleeding including drips of blood with bowel movements intermittently.  She was given Anusol HC cream for suspected perianal or hemorrhoidal bleeding and did not wish to resume Pentasa postpartum (resumed for 2 mos).  In mid September 2017 she had recurrence of right knee pain and swelling in saw her  rheumatologist at which time the knee was again aspirated and injected with steroids.  She saw Dr. Plunkett in January 2018 with stable symptoms with the plan to do a colonoscopy November 2018.  She had tried sulfasalazine for joint issues early on and sometime 2018 she was started again on sulfasalazine which was discontinued 8/2019. Bone density test 8/10/2018 was normal. She underwent a colonoscopy on 8/27/2018 for generalized abdominal pain and chronic diarrhea which showed discontinues areas of nonbleeding ulcerated mucosa at IC valve consistent with Crohn's ulcer that was injected with epinephrine due to bleeding.  She had also perianal skin tags with some some rectal tenderness an anal stricture.  There was ulcerated mucosa at the anus and in the distal rectum.  Small pedunculated polypoid lesion at the IC valve most consistent with pseudopolyps and there was a benign-appearing intrinsic severe stenosis of the IC valve that did not allow intubation of the terminal ileum. Biopsies of the IC valve showed acutely inflamed mucosal ulcer with reactive changes and the polyp was consistent with an inflammatory hyperplastic polyp.  At that time Dr. Plunkett recommended ciprofloxacin and Flagyl for 1 week, prednisone and to increase Humira to 40 mg SC weekly.  Although I do not have the Humira levels there is a note indicating that she had low levels with no antibodies when this change was made.  She tapered off of prednisone by the end of September 2018 while continuing Humira 40 mg SC weekly.  In 92018 she had a miscarriage. At her visit on 9/26/2018 with Dr. Plunkett she mentioned that she would like to get pregnant again and he wrote that he felt that if her symptoms improved during pregnancy then she can decrease her Humira at to 40 mg SC every 2 weeks.  Her symptoms did improved on Humira 40 mg SC weekly with some residual minor bloating and abdominal cramping with rare diarrhea.  In addition to the Humira she  was also on sulfasalazine with folic acid.  By April 2019 she began to family plan again and at that time she had abdominal cramping with possible partial small-bowel obstruction for 2 days with intermittent episodes as well.  Dr. Plunkett was concerned that the IC valve stricture was playing a role in the symptoms and recommended repeat Humira levels and antibodies and if adequate levels then change to Remicade given loss of response of weekly Humira.  She did become pregnant and had a miscarriage in the early summer of 2019.  Dr. Plunkett had discontinued sulfasalazine during her short pregnancy though her maternal fetal medicine doctor recommend that she can remain on it.  Dr. Plunkett saw patient on 8/12/2019 at which time she was continued on Humira SC weekly and her sulfasalazine was increased to 3 g/day.  IV iron was also being considered.  On 8/15/2019 she was seen in the GI clinic again due to fever of 101, lower abdominal pain and discomfort with bilateral suprapubic and right lower quadrant pain. She had blood work showing leukocytosis with a pregnancy test that was negative.  CT abdomen pelvis with IV and oral contrast on 8/15/2019 showed interloop abscess in the right lower quadrant with thick walls and loculation measuring 5.1 x 3.7 cm with peripheral enhancement.  This is adjacent to a very abnormal loop of terminal ileum with significant wall thickening and inflammatory changes.  There is additional edematous changes in the right lower quadrant adjacent to the cecum with mild cecal wall thickening and trace pelvic fluid though appendix was difficult to visualize due to inflammatory process.  There was no small-bowel obstruction or free intraperitoneal air.  When she saw Dr. Plunkett on 8/15/2019 she was afebrile and starting to feel somewhat better.  At that visit Dr. Plunkett recommended stopping Humira, starting Augmentin twice daily for 14 days and Flagyl for 5 days.  He recommended stopping  sulfasalazine and beginning Pentasa 4 g/day.  Labs on 8/16/2019 was significant for sodium 133 with normal creatinine, , white blood cell count 18.2, hemoglobin 11.0.  I have results from a gram stain of an abdominal abscess that showed preliminary findings of Gram-positive cocci.  Along with Candida on fungal cultures and negative anaerobic cultures with 1+ E coli.  She had a negative pregnancy test on 8/19/2019 and and normal pro calcitonin and negative C diff.  Albumin on 8/20/2019 is 2.8.  Patient hospitalized from 8/16-8/20/19 and was admitted from GI clinic when she presented with persistent RLQ abdominal pain, fever and nausea despite a few days of augmentin and flagyl.  As inpatient she was started on zosyn and flagyl and CT showed loculated abscess and she underwent IR guided abscess drainage and abscess cultures grew E coli.  Surgery and ID were consulted. 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  She was then discharged on 8/20/19 with IV ampicillin and po flagyl for 2 weeks with plan for f/u in GI clinic in 1 week and CTE the week after discharge. Patient was again hospitalized from 9/7-9/13/19 at which time it was felt that IV antibiotic treatment was unsuccessful at home and underwent laparoscopic mobilization of right colon with hand assist converted to open resection of distal 1 foot of ileum and cecum with drainage of interloop abscess on 9/10/19. Surgical path showed Crohns disease with clear margins.  She had spinal fluid leak from anesthesia procedure for surgery and had blood patch.  After surgery she recovered well overall though had perianal issues that were ongoing and increased diarrhea though this has improved. She has 1-3 soft BMs/day with some urgency, no blood in stool.  Patient was started on remicade 5 mg/kg with first dose 10/14/19, 2nd dose 10/28/19, 3rd dose 11/26/19 and started imuran 100 mg daily end of 9/2019.  She continued on folic acid 1  mg daily and also Pepcid BID. She saw Dr. Plunkett on 19 who recommended checking Remicade levels as well as obtaining colonoscopy at [facility]. She has Remicade levels on  which were remarkable for a level of 11 with no antibodies. By this time she was having 0-2 soft non-bloody BM per day.    Interval History:  - current IBD meds: Remicade 5 mg/kg every 8 weeks (started 10/14/19, last dose 3/16/20, next dose 20), Imuran 100 mg orally daily  - 1-3 soft to loose Bowel Movements/day  - Anxiety with COVID-19, doing video visits in the clinic but still seeing patients in the hospital  - heartburn for which she is taking daily Zanyac  - 20:  remicade levels  negative  - 20 LFT's normal, vitamin D 59.5, thiopurine metabolites 6- and 6-MMPN 209  - 20 Saw Optometry for annual exam and dry eyes, recommended the she use artificial tears as needed  - 20 Saw GI Dr. Plunkett recommended continuing meds and clinic visit in 4 months  - 3/3/20 Colonoscopy normal TI and colon (path: TI-active ileitis, negative for granuloma, dysplasia or malignancy, no definite features of chronicity)  - 3/16/20 Remicade level- pending  - NSAID use: No  - Narcotic use: No  - Alternative/complementary meds for IBD: No    Prior Pertinent Surgeries:    synovectomy  2015 Hemorrhoidectomy  2019 ileocolonic resection (righ colon removed, 1 foot of ileum removed). Surgical path showed Crohns disease with clear margins.    Pertinent Endoscopy/Imagin2015 EGD normal  2015 colonoscopy: severe colitis and ileitis in the ileocecal valve and unable to intubate the terminal ileum due to stricturing of the IC valve with mucosal ulcerations.  There is also granulation tissue internally like healed fissure and posterior midline and left lateral fistula granulation with prominence.  Otherwise the colon appeared normal  2015 SBFT: normal  18 colonoscopy: discontinuous areas of nonbleeding ulcerated  mucosa at IC valve consistent with Crohn's ulcer that was injected with epinephrine due to bleeding.  She had also perianal skin tags with some some rectal tenderness an anal stricture.  There was ulcerated mucosa at the anus and in the distal rectum.  Small pedunculated polypoid lesion at the IC valve most consistent with pseudopolyps and there was a benign-appearing intrinsic severe stenosis of the IC valve that did not allow intubation of the terminal ileum. Biopsies of the IC valve showed acutely inflamed mucosal ulcer with reactive changes and the polyp was consistent with an inflammatory hyperplastic polyp.  CT abdomen pelvis with IV and oral contrast on 8/15/2019 showed interloop abscess in the right lower quadrant with thick walls and loculation measuring 5.1 x 3.7 cm with peripheral enhancement.  This is adjacent to a very abnormal loop of terminal ileum with significant wall thickening and inflammatory changes.  There is additional edematous changes in the right lower quadrant adjacent to the cecum with mild cecal wall thickening and trace pelvic fluid though appendix was difficult to visualize due to inflammatory process.  There was no small-bowel obstruction or free intraperitoneal air.   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  3/3/20 colonoscopy: normal TI and colon (path: TI-active ileitis, negative for granuloma, dysplasia or malignancy, no definite features of chronicity)    Pertinent Labs:  11/1/19  HAV IgG negative  HAV IgM negative  Vitamin B12 296 (normal 211-911)  BHCG <5  HIV nonreactive  HCV Ab nonreactive  TSH 1.04 (normal)/FT4 0.91 (normal)  WBC 7.5  Hgb 11.0  Hct 36.4  pletelets 278  CRP 0.10  Na 139, K 3.7, Alb 4.2  AST 22/ALT 14/TB 0.3/ALP 50  IgA 716  TTG IgA <2     Therapeutic Drug Monitoring Labs:  Humira levels:  note indicating that she had low levels with no  antibodies when this change was made  1/7/20 12 week from start remicade level 11 with antibodies <22  3/16/20 trough [facility] remicade levels/abs pending    Prior IBD Therapies:  Prednisone  Sulfasalazine  MTX 7.5 to 15 mg po weekly- discontinued 1/2016  Humira (started summer 2007, stopped 2013, restarted 2015, stopped 8/2019)  Simponi (10/2013-6/2015)  Cimzia (6/2015)  Xeljanz (2015)  Pentasa (2015)    Vaccinations:  Influenza (inactive):  09/2019, due in 2020  Pneumococcal PCV 13: recommended  Pneumococcal PCV 23: recommended 2-12 mos after prevnar 13  Tetanus (TdaP): UTD  HPV (males and females ages 19-27 yo):    Not sure  Meningococcal: UTD with childhood vaccines  Hepatitis B:  immune  Hepatitis A:  3/2020, next due 9/2020  MMR (live vaccine):  UTD with childhood vaccines   Chickenpox status/Varicella (live vaccine):  immune  Shingrix: recommended    Review of Systems   Constitutional: Negative for chills, fever and weight loss.   HENT:        No oral ulcers, dysphagia, oral thrush   Eyes: Negative for blurred vision, pain and redness.   Respiratory: Negative for cough and shortness of breath.    Cardiovascular: Negative for chest pain.   Gastrointestinal: Positive for heartburn. Negative for abdominal pain, nausea and vomiting.   Genitourinary: Negative for dysuria and hematuria.   Musculoskeletal: Negative for back pain and joint pain.   Skin: Negative for rash.   Psychiatric/Behavioral: Negative for depression. The patient is nervous/anxious. The patient does not have insomnia.      All Medical History/Surgical History/Family History/Social History/Allergies have been reviewed and updated in EMR    Review of patient's allergies indicates:  No Known Allergies    Outpatient Medications Marked as Taking for the 3/23/20 encounter (Office Visit) with Arthur Vazquez MD   Medication Sig Dispense Refill    azaTHIOprine (IMURAN) 50 mg Tab Take 100 mg by mouth once daily.      cholecalciferol, vitamin D3, 5,000  unit capsule Take 5,000 Units by mouth once daily.      cyanocobalamin 1,000 mcg/mL injection Inject 1,000 mcg into the muscle every 30 days.       infliximab (REMICADE IV) 5 mg/kg.       iron-vitamin C 100-250 mg, ICAR-C, 100-250 mg Tab TAKE 1 TABLET EVERY DAY      ondansetron (ZOFRAN) 8 MG tablet Take 8 mg by mouth.      prenatal comb no.42/folic acid (PRENA1 CHEW ORAL)       ranitidine (ZANTAC) 150 MG capsule Take 150 mg by mouth.         Labs:   No results found for: WBC, HGB, HCT, MCV, PLT  No results found for: CREATININE, ALBUMIN, BILITOT, ALKPHOS, AST, ALT, GGT  No results found for: NIL, TBAG, TBAGNIL, MITOGENNIL, TBGOLD, TSPOTSCREN    Assessment/Plan:  Angelina Castaneda is a 40 y.o. 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 Since our last clinic visit she underwent a colonoscopy which was essentially normal with minimal inflammation of the TI on biopsies. From our perspective her disease is in remission and we do not plan to make any additional changes to her current medication regimen. Furthermore, we discussed family planning at length and explained to the patient that based on her symptoms and scope it would be OK for her to get pregnant.     # Crohn's disease (ileal/ICV, perianal skin tags/anal stricture, s/p ileal/right colon resection due to intrabdominal abscess 9/2019)  - continue remicade 5 mg/kg every 8 weeks  - continue imuran 100 mg po daily   -  discussed precautions in setting of immunosuppression in regards to COVID-19 and information provided in after visit summary  - vitamin B12 deficiency (vitaminb B12 296)- continue vit B12 1000 mcg SC monthly  -  repeat colonoscopy 3/3/2021 with Dr. Plunkett  - family planning:  okay to get pregnant, no live vaccines for  for first 6 months of life, okay to breastfeed on current IBD meds, do not stop current IBD meds; reviewed risks of meds during pregnancy  - CRC risk: sx onset , ileal/ICV/perianal, average risk  - drug monitoring labs: CBC/LFTs every 3 months (due at the end of 2020- to be ordered by Dr. 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 with recurrent episodes of right knee swelling/effusion requiring aspiration and steroid injections  - controlled on remicade/imuran    # IBD specific health maintenance:  Skin exam-recommended yearly  Risk for osteopenia/osteoporosis- none  Pap smear-recommended yearly due to chronic immunosuppression also in her case it is important due to prior abnormal paps  Vitamin D (vit D 59.5 on 20)- continue vit D 5000 IU/day but once COVID-19 better then can reduce to vit D 1000 IU/day  Vaccines- no live vaccine, pt will get shingrix and prevnar 13 with PCP office and f/u with pneumovax 23 and 2nd shingrix 2-6 mos later, 2nd hep A due 2020    Follow up: as needed, f/u with Dr. Plunkett 6 mos    The patient location is:  Patient Home   The chief complaint leading to consultation is: Crohn's disease  Visit type: Virtual visit with synchronous audio and video  Total time spent with patient:  15 minutes  Each patient to whom he or she provides medical services by telemedicine is:  (1) informed of the relationship between the physician and patient and the respective role of any other health care provider with respect to  management of the patient; and (2) notified that he or she may decline to receive medical services by telemedicine and may withdraw from such care at any time.    Josh Forman M.D.  Gastroenterology Fellow, PGY-VI  Pager: [phone]  [facility]

## 2020-03-23 NOTE — PATIENT INSTRUCTIONS
Patient Instructions:  -Labs at the end of April (CBC/LFT) and then every 3 months  -Continue Vitamin B12 shots and Vitamin D orally (it is OK to continue 5,000 IU for now)  -Repeat colonoscopy 03/2021   -Skin exam and pap smear yearly  -Get up to date with vaccines: Flu shots yearly, Prevnar, Pneumovax, Hepatitis A (September 2020 next dose), Tdap every 10 years, Shingrix; from all this vaccines we would like you to prioritize the Prevnar vaccine  -Follow up with Dr. Plunkett in 6 months    If you get pregnant:  -Do not stop your medications  -It is safe to breastfeed  -No live vaccines for baby    Here are some recommendations in regards to your inflammatory bowel disease (Crohn's disease and ulcerative colitis) and coronavirus-COVID-19.   - Please do not stop any medications given this can put you at risk for a flare up or complication requiring hospitalization or steroids  - Wash your hands for 20 seconds or longer with soap and water   - If soap and water are not available then use hand  with 60% or more alcohol content and rub your hands for 20 seconds until dry  - Avoid touching your face including eyes, nose, mouth  - Contact us immediately if you have fevers, cough, or shortness of breath, if severe call 911  - Social distancing- avoid close contact from others within 6 feet  - Avoid travel unless absolutely necessary  - Avoid gatherings and other public events  - Avoid sick contacts  - If you are able to work from home please discuss this with your employer   - Let us know if you need a letter to cancel travel or to work from home    For more information please visit the website listed below:  [facility]  https://www.crohnscolitisfoundation.org/what-ibd-patients-should-know-about-2019-novel-coronavirus-[email or web address]  CDC  https://www.cdc.gov/coronavirus/2019-ncov/about/prevention.html?CDC_AA_refVal=https%3A%2F%2Fwww.cdc.gov%2Fcoronavirus%2J7582-ppqh%2Fabout%2Fprevention-treatment.html  Please see below for  some tips on preventing the virus and what to do with your medications for inflammatory bowel disease (IBD).

## 2020-03-25 ENCOUNTER — PATIENT MESSAGE (OUTPATIENT)
Dept: GASTROENTEROLOGY | Facility: CLINIC | Age: 40
End: 2020-03-25

## 2020-03-26 ENCOUNTER — TELEPHONE (OUTPATIENT)
Dept: GASTROENTEROLOGY | Facility: CLINIC | Age: 40
End: 2020-03-26

## 2020-03-26 ENCOUNTER — DOCUMENTATION ONLY (OUTPATIENT)
Dept: GASTROENTEROLOGY | Facility: HOSPITAL | Age: 40
End: 2020-03-26

## 2020-03-26 ENCOUNTER — TELEPHONE (OUTPATIENT)
Dept: GASTROENTEROLOGY | Facility: HOSPITAL | Age: 40
End: 2020-03-26

## 2020-03-26 DIAGNOSIS — K50.00 CROHN'S DISEASE OF SMALL INTESTINE WITHOUT COMPLICATION: Primary | ICD-10-CM

## 2020-03-26 NOTE — TELEPHONE ENCOUNTER
03/26/2020  10:16 AM    Patient called back after hearing my message. We discussed her Remicade results and explained that we recommend that Remicade be increased to 7.5 mg/kg every 8 weeks. Patient verbalized understanding our recommendation and rationale behind it.    Her next 3 doses are scheduled for:  --Monday, May 11, 2020  --Monday, July 6, 2020  --Monday, August 31, 2020    With the increase in dose she will need to have repeat remicade levels before her 3rd dose increase on August 31, 2020.     She would like us to follow repeat levels as long as she can have it drawn back home. I explained that we can place the order and she can have it done back home.    Josh Forman M.D.  Gastroenterology Fellow, PGY-VI  Pager: [phone]  [facility]

## 2020-03-26 NOTE — TELEPHONE ENCOUNTER
Spoke to Dr. Plunkett and he will do remicade levels/abs and pt can let us know and we can review these results    SS

## 2020-03-26 NOTE — TELEPHONE ENCOUNTER
03/26/2020   9:29 AM    Called patient to review Remicade level results.  Unable to speak to patient but left a detailed message with my call back information.    Josh Forman M.D.  Gastroenterology Fellow, PGY-VI  Pager: [phone]  [facility]

## 2020-03-26 NOTE — PROGRESS NOTES
03/26/2020  9:25 AM    Remicade Trough Level Drawn on 3/16/20 on Remicade 5 mg/kg every 8 weeks:  --Level 4.7  --Antibodies less than 22      Josh Forman M.D.  Gastroenterology Fellow, PGY-VI  Pager: [phone]  [facility]

## 2020-04-14 ENCOUNTER — PATIENT MESSAGE (OUTPATIENT)
Dept: GASTROENTEROLOGY | Facility: CLINIC | Age: 40
End: 2020-04-14

## 2020-04-14 DIAGNOSIS — K50.00 CROHN'S DISEASE OF SMALL INTESTINE WITHOUT COMPLICATION: Primary | ICD-10-CM

## 2020-04-14 NOTE — TELEPHONE ENCOUNTER
Pt need LFT and CBC at the end of April.   Called and left message informing pt she was due for labs at the end of April.

## 2020-05-10 ENCOUNTER — PATIENT MESSAGE (OUTPATIENT)
Dept: GASTROENTEROLOGY | Facility: CLINIC | Age: 40
End: 2020-05-10

## 2020-09-10 ENCOUNTER — PATIENT MESSAGE (OUTPATIENT)
Dept: GASTROENTEROLOGY | Facility: CLINIC | Age: 40
End: 2020-09-10

## 2020-10-08 ENCOUNTER — OFFICE VISIT (OUTPATIENT)
Dept: GASTROENTEROLOGY | Facility: CLINIC | Age: 40
End: 2020-10-08
Attending: INTERNAL MEDICINE
Payer: COMMERCIAL

## 2020-10-08 DIAGNOSIS — K50.00 CROHN'S DISEASE OF SMALL INTESTINE WITHOUT COMPLICATION: Primary | ICD-10-CM

## 2020-10-08 DIAGNOSIS — K21.9 GASTROESOPHAGEAL REFLUX DISEASE, UNSPECIFIED WHETHER ESOPHAGITIS PRESENT: ICD-10-CM

## 2020-10-08 DIAGNOSIS — D84.9 IMMUNOSUPPRESSED STATUS: ICD-10-CM

## 2020-10-08 DIAGNOSIS — M12.80: ICD-10-CM

## 2020-10-08 PROCEDURE — 99215 PR OFFICE/OUTPT VISIT, EST, LEVL V, 40-54 MIN: ICD-10-PCS | Mod: 95,,, | Performed by: INTERNAL MEDICINE

## 2020-10-08 PROCEDURE — 99215 OFFICE O/P EST HI 40 MIN: CPT | Mod: 95,,, | Performed by: INTERNAL MEDICINE

## 2020-10-08 RX ORDER — LOPERAMIDE HYDROCHLORIDE 2 MG/1
2 CAPSULE ORAL 4 TIMES DAILY PRN
COMMUNITY
End: 2021-04-14

## 2020-10-08 RX ORDER — FAMOTIDINE 40 MG/1
40 TABLET, FILM COATED ORAL DAILY
COMMUNITY
End: 2021-07-07

## 2020-10-08 RX ORDER — CHOLESTYRAMINE 4 G/9G
4 POWDER, FOR SUSPENSION ORAL
COMMUNITY
End: 2021-04-14

## 2020-10-08 NOTE — PROGRESS NOTES
[facility]             Inflammatory Bowel Disease Telemedicine Follow-up  Note               TODAY'S VISIT DATE:  3/23/2020    Chief Complaint:   Chief Complaint   Patient presents with    Crohn's Disease     PCP: Fitz Frias    Previous History:  Angelina Castaneda is a 40 y.o. 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 She had synovectomy.  CRP January 2006 was elevated at 35.4 with ESR 38 and anemia with hemoglobin 10.7.  Symptoms remitted thereafter with methotrexate 15 mg p.o. Weekly (helped with arthropathy and no SE).  At her visit in April 2007 she did report some intermittent pain in both lateral hips.  She continued on Voltaren 75 mg twice a day and methotrexate 15 mg p.o. weekly.  She was lost to follow-up between April 2007 and November 2007 due to insurance changes. In late summer 2007 she began with recurrent pain and swelling of the left knee and by that time had not been taking methotrexate on a regular basis though had decreased the dose to 10 mg once a week.  She restart methotrexate 15 mg once a week with Voltaren 75 mg twice a day.  This seemed to remit her symptoms but not completely resolve the symptoms and therefore biologic treatments discussed and Humira recommended.  She started Humira and then was on 40 mg SC every 2 weeks while continuing methotrexate 15 mg p.o. once a week.  By 2009 she was in clinical remission with her joints and was not requiring Voltaren though had a flare up  in October 2009 that coincided with her being 10 days late for her Humira and decreasing her methotrexate from 10 mg weekly to 7.5 mg weekly.  Her symptoms went into remission with a Medrol Dosepak and increase of methotrexate 10 mg weekly while she continued on Humira 40 mg SC every 2 weeks.  In June 2010 she had progressive and recurrent pain with swelling and stiffness of the left knee and had aspiration intra-articular steroids.  In August 2010 she had return of left knee symptoms and required another Medrol Dosepak.  At that time her Humira was continued and her methotrexate was increased to 15 mg p.o. once a week.  In summary 2011 she continued to have mild anemia with slightly decreased folate and B12 levels.  Overall she was feeling well by fall of 2011 except for some mild recurrence swelling of the left knee and stiffness.  She continued on Humira 40 mg SC every 2 weeks, methotrexate 15 mg p.o. weekly with folic acid 1 mg daily.  By 2013 her methotrexate and Humira discontinued due to ineffective.  In fall 2013 she started simponi and restarted MTX 10 mg po weekly 10/2013. In December 2013 she had recurrent left knee pain and swelling and underwent aspiration and injection with Kenalog.  By May 2014 she continued on Simponi once a month and methotrexate 15 mg p.o. weekly.  In January 2015 she saw surgery for a large external hemorrhoid at which time the plan was to proceed with an external hemorrhoid excision.  Biopsies of the hemorrhoid suggested atypical epithelial change with suspicion for viral cytopathic changes and HPV negative.  Patient recurrence left knees pain and swelling in March of 2015 at which time aspiration and steroid injection was done.  At that time she had continued on Simponi monthly and methotrexate 10 mg weekly.  By April 2015 her methotrexate was increased to 17.5 mg once a week while continuing on monthly Simponi.  In early June 2015 Simponi was discontinued and patient was  started on Cimzia and by June 2015 she was on Cimzia every 2 weeks while continuing methotrexate 15 mg p.o. weekly.  In July 2015 due to recurrence symptoms of large knee inflammatory infusion she took a 2 week course of prednisone which was helpful.  She had repeat aspiration injection of steroids in July of 2015 of her left knee. After being on cimzia for about 2 mos her joint pains worsened considerably.  She started on xeljanz (unknown dose) which helped her joint pains and discontinued within a few mos and this was discontinued due to family planning in future.  She saw surgeons September 2015 again at which time she was found to have irritated anal skin with inflammation and bleeding associated with bowel movements with inflammed perianal skin tag with findings concerning for Crohn's disease. Patient saw Dr. Plunkett on 10/15/2015 and he felt that the perianal issues were more likely to be consistent with fistula.  By this time patient was on Xeljanz for her joint issues.  EGD November 2015 was normal. Colonoscopy showed severe colitis and ileitis in the ileocecal valve and unable to intubate the terminal ileum due to stricturing of the IC valve with mucosal ulcerations.  There is also granulation tissue internally like healed fissure and posterior midline and left lateral fistula granulation with prominence.  Otherwise the colon appeared normal. Patient was started on Pentasa, continued methotrexate and plans were to change from Xeljanz to Humira.  Also ciprofloxacin and Flagyl was started.  Biopsies of the duodenum were normal.  Biopsies of the IC valve showed chronic active colitis.  SBFT November 2015 showed loss of normal fold pattern in the patient's terminal ileum. Around Nov/Dec 2015 she started Humira and MTX 15 mg po weekly. She was not tolerant of Flagyl.  At her visit with Dr. Plunkett December 2015 she requested to stop methotrexate (future family planning) and finally discontinued in January 2016  while continuing Humira 40 mg SC every 2 weeks and Pentasa.  By March 2016 she was feeling well on this regimen and had minimal bleeding once or twice a week with rare mucus.  She saw Rheumatology April 2016 and May 2016 after developing right knee pain and swelling at which time aspiration injection of steroids was done.  At her visit with Rheumatology May of 2016 her rheumatologist was going to coordinate with Gastroenterology to change Pentasa to sulfasalazine to help with joint issues.  In July 2016 got . In September of 2016 she again had right knee infusion that was aspirated and steroids were injected.  She became pregnant in the end of September 2016 (delivered healthy baby girl by Csection and baby was 3 weeks early- 5/29/17) at which time she was asymptomatic from her bowel disease and at her visit on 10/21/2016 Pentasa was discontinued and she was started on Flagyl.  There is notation of getting Humira levels but but this was deferred due to patient symptomatic improvement by in November of 2016.  She continued on Humira 40 mg SC every 2 weeks as monotherapy during pregnancy and stopped at 28 weeks per recs by Dr. Plunkett. In January 2017 she had aspiration and injection of steroids into her right knee due to her current knee pain and swelling.  She saw Dr. Plunkett 3/31/2017 at which time she was 28 weeks pregnant and he was instructed to discontinue Humira.  Approximately 2 weeks after delivery she developed some abdominal cramping, arthralgia and knee infusion and pain. Her rheumatologist resumed her Humira and she had a rapid response with improve knee pain and resolution of her abdominal cramping though some rectal bleeding including drips of blood with bowel movements intermittently.  She was given Anusol HC cream for suspected perianal or hemorrhoidal bleeding and did not wish to resume Pentasa postpartum (resumed for 2 mos).  In mid September 2017 she had recurrence of right knee pain and  swelling in saw her rheumatologist at which time the knee was again aspirated and injected with steroids.  She saw Dr. Plunkett in January 2018 with stable symptoms with the plan to do a colonoscopy November 2018.  She had tried sulfasalazine for joint issues early on and sometime 2018 she was started again on sulfasalazine which was discontinued 8/2019. Bone density test 8/10/2018 was normal. She underwent a colonoscopy on 8/27/2018 for generalized abdominal pain and chronic diarrhea which showed discontinues areas of nonbleeding ulcerated mucosa at IC valve consistent with Crohn's ulcer that was injected with epinephrine due to bleeding.  She had also perianal skin tags with some some rectal tenderness an anal stricture.  There was ulcerated mucosa at the anus and in the distal rectum.  Small pedunculated polypoid lesion at the IC valve most consistent with pseudopolyps and there was a benign-appearing intrinsic severe stenosis of the IC valve that did not allow intubation of the terminal ileum. Biopsies of the IC valve showed acutely inflamed mucosal ulcer with reactive changes and the polyp was consistent with an inflammatory hyperplastic polyp.  At that time Dr. Plunkett recommended ciprofloxacin and Flagyl for 1 week, prednisone and to increase Humira to 40 mg SC weekly.  Although I do not have the Humira levels there is a note indicating that she had low levels with no antibodies when this change was made.  She tapered off of prednisone by the end of September 2018 while continuing Humira 40 mg SC weekly.  In 92018 she had a miscarriage. At her visit on 9/26/2018 with Dr. Plunkett she mentioned that she would like to get pregnant again and he wrote that he felt that if her symptoms improved during pregnancy then she can decrease her Humira at to 40 mg SC every 2 weeks.  Her symptoms did improved on Humira 40 mg SC weekly with some residual minor bloating and abdominal cramping with rare diarrhea.  In addition  to the Humira she was also on sulfasalazine with folic acid.  By April 2019 she began to family plan again and at that time she had abdominal cramping with possible partial small-bowel obstruction for 2 days with intermittent episodes as well.  Dr. Plunkett was concerned that the IC valve stricture was playing a role in the symptoms and recommended repeat Humira levels and antibodies and if adequate levels then change to Remicade given loss of response of weekly Humira.  She did become pregnant and had a miscarriage in the early summer of 2019.  Dr. Plunkett had discontinued sulfasalazine during her short pregnancy though her maternal fetal medicine doctor recommend that she can remain on it.  Dr. Plunkett saw patient on 8/12/2019 at which time she was continued on Humira SC weekly and her sulfasalazine was increased to 3 g/day.  IV iron was also being considered.  On 8/15/2019 she was seen in the GI clinic again due to fever of 101, lower abdominal pain and discomfort with bilateral suprapubic and right lower quadrant pain. She had blood work showing leukocytosis with a pregnancy test that was negative.  CT abdomen pelvis with IV and oral contrast on 8/15/2019 showed interloop abscess in the right lower quadrant with thick walls and loculation measuring 5.1 x 3.7 cm with peripheral enhancement.  This is adjacent to a very abnormal loop of terminal ileum with significant wall thickening and inflammatory changes.  There is additional edematous changes in the right lower quadrant adjacent to the cecum with mild cecal wall thickening and trace pelvic fluid though appendix was difficult to visualize due to inflammatory process.  There was no small-bowel obstruction or free intraperitoneal air.  When she saw Dr. Plunkett on 8/15/2019 she was afebrile and starting to feel somewhat better.  At that visit Dr. Plunkett recommended stopping Humira, starting Augmentin twice daily for 14 days and Flagyl for 5 days.  He  recommended stopping sulfasalazine and beginning Pentasa 4 g/day.  Labs on 8/16/2019 was significant for sodium 133 with normal creatinine, , white blood cell count 18.2, hemoglobin 11.0.  I have results from a gram stain of an abdominal abscess that showed preliminary findings of Gram-positive cocci.  Along with Candida on fungal cultures and negative anaerobic cultures with 1+ E coli.  She had a negative pregnancy test on 8/19/2019 and and normal pro calcitonin and negative C diff.  Albumin on 8/20/2019 is 2.8.  Patient hospitalized from 8/16-8/20/19 and was admitted from GI clinic when she presented with persistent RLQ abdominal pain, fever and nausea despite a few days of augmentin and flagyl.  As inpatient she was started on zosyn and flagyl and CT showed loculated abscess and she underwent IR guided abscess drainage and abscess cultures grew E coli.  Surgery and ID were consulted. 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  She was then discharged on 8/20/19 with IV ampicillin and po flagyl for 2 weeks with plan for f/u in GI clinic in 1 week and CTE the week after discharge. Patient was again hospitalized from 9/7-9/13/19 at which time it was felt that IV antibiotic treatment was unsuccessful at home and underwent laparoscopic mobilization of right colon with hand assist converted to open resection of distal 1 foot of ileum and cecum with drainage of interloop abscess on 9/10/19. Surgical path showed Crohns disease with clear margins.  She had spinal fluid leak from anesthesia procedure for surgery and had blood patch.  After surgery she recovered well overall though had perianal issues that were ongoing and increased diarrhea though this has improved. She has 1-3 soft BMs/day with some urgency, no blood in stool.  Patient was started on remicade 5 mg/kg with first dose 10/14/19, 2nd dose 10/28/19, 3rd dose 11/26/19 and started imuran 100 mg daily end of 9/2019.  She  continued on folic acid 1 mg daily and also Pepcid BID. She saw Dr. Plunkett on 19 who recommended checking Remicade levels as well as obtaining colonoscopy [place] [facility] [place]. She has Remicade levels on  which were remarkable for a level of 11 with no antibodies. By this time she was having 0-2 soft non-bloody BM per day.  On 20 Remicade levels 11 with negative antibodies and 20 6TG 258/6MMP 209.  Colonoscopy on 3/3/2020 was normal endoscopically of the colon and terminal ileum though biopsies of terminal ileum showed active ileitis with no dysplasia. At visit 3/23/20 we felt she was doing well and no need to change treatment and okay to family plan.     Interval History:  - current IBD meds: Remicade 7.5 mg/kg every 8 weeks (started 10/14/19, increased to 7.5 mg/kg q 8 wks 20, LD , ND 10/23)-[facility] Imuran 100 mg orally daily  - 0-3 liquid, 0-1 nocturnal, no blood  - D&C- end of May 2020  - BMs- sometimes 1-4 BMs/day  - questran 1 packet qod and imodium caused abdominal pain, constipation and vasovagal episodes with straining; does take imodium if needed prior to long road trips/outings  - 3/16/20 trough remicade levels 4.7/abs negative  - 20 trough remicade 13/Abs negative   - CTE: normal SB, resection, mild hepatic steatosis, stable fullness of the renal calyces right greater than left  - heartburn- pepcid 20 mg daily  - NSAID use: No  - Narcotic use: No  - Alternative/complementary meds for IBD: No    Prior Pertinent Surgeries:    synovectomy  2015 Hemorrhoidectomy  2019 ileocolonic resection (righ colon removed, 1 foot of ileum removed). Surgical path showed Crohns disease with clear margins.    Pertinent Endoscopy/Imagin2015 EGD normal  2015 colonoscopy: severe colitis and ileitis in the ileocecal valve and unable to intubate the terminal ileum due to stricturing of the IC valve with mucosal ulcerations.  There is also granulation tissue internally like  healed fissure and posterior midline and left lateral fistula granulation with prominence.  Otherwise the colon appeared normal  11/2015 SBFT: normal  8/27/18 colonoscopy: discontinuous areas of nonbleeding ulcerated mucosa at IC valve consistent with Crohn's ulcer that was injected with epinephrine due to bleeding.  She had also perianal skin tags with some some rectal tenderness an anal stricture.  There was ulcerated mucosa at the anus and in the distal rectum.  Small pedunculated polypoid lesion at the IC valve most consistent with pseudopolyps and there was a benign-appearing intrinsic severe stenosis of the IC valve that did not allow intubation of the terminal ileum. Biopsies of the IC valve showed acutely inflamed mucosal ulcer with reactive changes and the polyp was consistent with an inflammatory hyperplastic polyp.  CT abdomen pelvis with IV and oral contrast on 8/15/2019 showed interloop abscess in the right lower quadrant with thick walls and loculation measuring 5.1 x 3.7 cm with peripheral enhancement.  This is adjacent to a very abnormal loop of terminal ileum with significant wall thickening and inflammatory changes.  There is additional edematous changes in the right lower quadrant adjacent to the cecum with mild cecal wall thickening and trace pelvic fluid though appendix was difficult to visualize due to inflammatory process.  There was no small-bowel obstruction or free intraperitoneal air.   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  3/3/20 colonoscopy: normal TI and colon (path: TI-active ileitis, negative for granuloma, dysplasia or malignancy, no definite features of chronicity)     Therapeutic Drug Monitoring Labs:  Humira levels:  note indicating that she had low levels with no antibodies when this change was made  1/7/20 12 week from start remicade level 11 with  antibodies <22  1/31/20 6TG 258/6MMP 209  3/16/20 trough remicade levels 4.7/abs negative on remicade 5 mg/kg q 8 weeks, imuran 100 mg/d  8/31/20 trough remicade 13/Abs negative on remicade 7.5 mg/kg q 8 wks, imuran 100 mg/d    Prior IBD Therapies:  Prednisone  Sulfasalazine  MTX 7.5 to 15 mg po weekly- discontinued 1/2016  Humira (started summer 2007, stopped 2013, restarted 2015, stopped 8/2019)  Simponi (10/2013-6/2015)  Cimzia (6/2015)  Xeljanz (2015)  Pentasa (2015)    Vaccinations:  No results found for: HEPBSAB  No results found for: HEPAIGG  No results found for: VARICELLAZOS, VARICELLAINT  Immunization History   Administered Date(s) Administered    Hepatitis A, Adult 03/06/2020    Influenza 09/16/2020    Influenza - Quadrivalent - PF *Preferred* (6 months and older) 01/03/2014, 10/08/2014, 10/07/2015, 09/12/2016, 09/18/2017, 09/18/2018, 10/08/2019, 09/21/2020    Influenza - Trivalent - PF (ADULT) 10/10/2011, 10/02/2012    Rho (D) Immune Globulin - IM 05/30/2017    Tdap 05/03/2017     Influenza (inactive):  9/16/20  Pneumococcal PCV 13: recommended  Pneumococcal PCV 23: recommended 2-12 mos after prevnar 13  Tetanus (TdaP): 5/3/17  HPV (males and females ages 19-25 yo):    Not sure  Meningococcal: not sure  Hepatitis B:  immune  Hepatitis A:  3/2020, due   MMR (live vaccine):  Not sure  Chickenpox status/Varicella (live vaccine):  immune  Shingrix: recommended    Review of Systems   Constitutional: Negative for chills, fever and weight loss.   HENT:        No oral ulcers, dysphagia, oral thrush   Eyes: Negative for blurred vision, pain and redness.   Respiratory: Negative for cough and shortness of breath.    Cardiovascular: Negative for chest pain.   Gastrointestinal: Negative for abdominal pain, heartburn, nausea and vomiting.   Genitourinary: Negative for dysuria and hematuria.   Musculoskeletal: Negative for back pain and joint pain.   Skin: Negative for rash.   Psychiatric/Behavioral: Negative for  depression. The patient is not nervous/anxious and does not have insomnia.      All Medical History/Surgical History/Family History/Social History/Allergies have been reviewed and updated in EMR    Review of patient's allergies indicates:  No Known Allergies    Outpatient Medications Marked as Taking for the 10/8/20 encounter (Office Visit) with Arthur Vazquez MD   Medication Sig Dispense Refill    azaTHIOprine (IMURAN) 50 mg Tab Take 100 mg by mouth once daily.      cholecalciferol, vitamin D3, 5,000 unit capsule Take 5,000 Units by mouth once daily.      cholestyramine (QUESTRAN) 4 gram packet Take 4 g by mouth 3 (three) times daily with meals. 1 packet  Couple times a week   Over a month ago      cyanocobalamin 1,000 mcg/mL injection Inject 1,000 mcg into the muscle every 30 days.       famotidine (PEPCID) 40 MG tablet Take 40 mg by mouth once daily.      infliximab (REMICADE IV) 7.5 mg/kg every 8 weeks.       iron-vitamin C 100-250 mg, ICAR-C, 100-250 mg Tab TAKE 1 TABLET EVERY DAY      loperamide (IMODIUM) 2 mg capsule Take 2 mg by mouth 4 (four) times daily as needed for Diarrhea. 1-2 tabs once a day  Only uses a few times a month      prenatal comb no.42/folic acid (PRENA1 CHEW ORAL)        Labs:  Reviewed from care everywhere ([facility])    Assessment/Plan:  Angelina Castaneda is a 40 y.o. 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  She had low trough remicade levels 3/2020 so starting 5/11/20 increased dose to 7.5 mg/kg q 8 wks. She will be due for another colonoscopy 3/2021 with Dr. Plunkett.  In 5/2020 she had another miscarriage.  She  had been taking questran and imodium for frequent BMS and initially this helped after her surgery though now causes constipation, abdominal pain and vasovagal episodes. I told her that this is likely related to IBS and I would prefer her to try metamucil instead.  She will start metamucil initially 1/2 dose and then increase to full dose and will let us know if this works for her. Other options would be smaller doses of questran or imodium.     # Crohn's disease (ileal/ICV, perianal skin tags/anal stricture, s/p ileal/right colon resection due to intrabdominal abscess 2019)  - continue remicade 7.5 mg/kg every 8 weeks  - continue imuran 100 mg po daily- discussed   - vitamin B12 deficiency (vitamin B12 296)- continue vit B12 1000 mcg SC monthly  - colonoscopy 3/2021 with Dr. Plunkett  - family planning:  okay to get pregnant, no live vaccines for  for first 6 months of life, okay to breastfeed on current IBD meds, do not stop current IBD meds; reviewed risks of meds during pregnancy  - CRC risk: sx onset , ileal/ICV/perianal, average risk  - drug monitoring labs: CBC/CMP (due 2020), TPMT (normal 2019), TB quantiferon (due 2020), Hep B testing (HBsAg negative 2019, HBcoreAb negative 2019)  - TDM: trough remicade level/ab 2021    # IBS  - likely frequent BMs due to this  - stop questran as she has done, avoid imodium  - plans to try metamucil to see if this will help regular BM to see if this helps with SE of constipation    # GERD:  - well controlled on H2 blocker    # 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 with recurrent episodes of right knee swelling/effusion requiring aspiration and steroid injections  - controlled on remicade/imuran    # IBD specific health maintenance:  Skin exam-yearly  Risk for osteopenia/osteoporosis- none  Pap smear-recommended yearly due to chronic immunosuppression also in her case it is important due to prior abnormal  paps  Vitamin D (vit D 59.5 on 1/31/20)- continue vit D 5000 IU/day   Vaccines- no live vaccine, reminded to get prevnar 13, 2nd hep A, shingrix    Follow up: 4/2020 after colonoscopy    The patient location is: Work  The chief complaint leading to consultation is: Crohn's disease    Visit type: audiovisual    Face to Face time with patient: 30 minutes  40 minutes of total time spent on the encounter, which includes face to face time and non-face to face time preparing to see the patient (eg, review of tests), Obtaining and/or reviewing separately obtained history, Documenting clinical information in the electronic or other health record, Independently interpreting results (not separately reported) and communicating results to the patient/family/caregiver, or Care coordination (not separately reported).     Each patient to whom he or she provides medical services by telemedicine is:  (1) informed of the relationship between the physician and patient and the respective role of any other health care provider with respect to management of the patient; and (2) notified that he or she may decline to receive medical services by telemedicine and may withdraw from such care at any time.    Arthur Vazquez MD   Department of Gastroenterology  Medical Director, Inflammatory Bowel Disease

## 2020-10-08 NOTE — PATIENT INSTRUCTIONS
- start metamucil 1/2 dose and see how you feel and you can titrate as needed up or down based on how you do  - give us update on symptoms in 1 weeks  - continue current meds  - let me know if you would like to consider stopping imuran  - vaccines- input date of flu shot  - please get me your childhood vaccines  - pt to get the following vaccines- tetanus every 10 years, 2nd hep A due now, shingrix, prevnar 13, pneumovax 23 should be 2-12 mos after prevnar 13

## 2020-10-16 ENCOUNTER — TELEPHONE (OUTPATIENT)
Dept: GASTROENTEROLOGY | Facility: CLINIC | Age: 40
End: 2020-10-16

## 2020-10-23 NOTE — TELEPHONE ENCOUNTER
Called & spoke to pt  - Has not started Metamucil yet but plans to start next week  - Reminder set for 11/6/2020 for symptom update

## 2020-11-11 ENCOUNTER — PATIENT MESSAGE (OUTPATIENT)
Dept: GASTROENTEROLOGY | Facility: CLINIC | Age: 40
End: 2020-11-11

## 2020-11-18 ENCOUNTER — PATIENT MESSAGE (OUTPATIENT)
Dept: GASTROENTEROLOGY | Facility: CLINIC | Age: 40
End: 2020-11-18

## 2021-04-14 ENCOUNTER — DOCUMENTATION ONLY (OUTPATIENT)
Dept: GASTROENTEROLOGY | Facility: CLINIC | Age: 41
End: 2021-04-14

## 2021-04-14 ENCOUNTER — OFFICE VISIT (OUTPATIENT)
Dept: GASTROENTEROLOGY | Facility: CLINIC | Age: 41
End: 2021-04-14
Payer: COMMERCIAL

## 2021-04-14 ENCOUNTER — PATIENT MESSAGE (OUTPATIENT)
Dept: GASTROENTEROLOGY | Facility: CLINIC | Age: 41
End: 2021-04-14

## 2021-04-14 DIAGNOSIS — K50.00 CROHN'S DISEASE OF SMALL INTESTINE WITHOUT COMPLICATION: Primary | ICD-10-CM

## 2021-04-14 DIAGNOSIS — D84.9 IMMUNOSUPPRESSED STATUS: ICD-10-CM

## 2021-04-14 DIAGNOSIS — M12.80: ICD-10-CM

## 2021-04-14 PROCEDURE — 99215 OFFICE O/P EST HI 40 MIN: CPT | Mod: 95,,, | Performed by: INTERNAL MEDICINE

## 2021-04-14 PROCEDURE — 99215 PR OFFICE/OUTPT VISIT, EST, LEVL V, 40-54 MIN: ICD-10-PCS | Mod: 95,,, | Performed by: INTERNAL MEDICINE

## 2021-04-14 RX ORDER — KETOCONAZOLE 20 MG/ML
SHAMPOO, SUSPENSION TOPICAL
COMMUNITY
Start: 2020-07-15 | End: 2021-07-07

## 2021-04-14 RX ORDER — ONDANSETRON 4 MG/1
4 TABLET, FILM COATED ORAL
COMMUNITY
Start: 2020-07-29

## 2021-04-19 ENCOUNTER — TELEPHONE (OUTPATIENT)
Dept: GASTROENTEROLOGY | Facility: CLINIC | Age: 41
End: 2021-04-19

## 2021-04-20 ENCOUNTER — PATIENT MESSAGE (OUTPATIENT)
Dept: GASTROENTEROLOGY | Facility: CLINIC | Age: 41
End: 2021-04-20

## 2021-04-21 ENCOUNTER — PATIENT MESSAGE (OUTPATIENT)
Dept: GASTROENTEROLOGY | Facility: CLINIC | Age: 41
End: 2021-04-21

## 2021-04-22 ENCOUNTER — PATIENT MESSAGE (OUTPATIENT)
Dept: GASTROENTEROLOGY | Facility: CLINIC | Age: 41
End: 2021-04-22

## 2021-04-27 ENCOUNTER — PATIENT MESSAGE (OUTPATIENT)
Dept: GASTROENTEROLOGY | Facility: CLINIC | Age: 41
End: 2021-04-27

## 2021-04-28 ENCOUNTER — PATIENT MESSAGE (OUTPATIENT)
Dept: GASTROENTEROLOGY | Facility: CLINIC | Age: 41
End: 2021-04-28

## 2021-05-10 ENCOUNTER — PATIENT MESSAGE (OUTPATIENT)
Dept: GASTROENTEROLOGY | Facility: CLINIC | Age: 41
End: 2021-05-10

## 2021-06-15 ENCOUNTER — PATIENT MESSAGE (OUTPATIENT)
Dept: GASTROENTEROLOGY | Facility: CLINIC | Age: 41
End: 2021-06-15

## 2021-06-16 ENCOUNTER — PATIENT MESSAGE (OUTPATIENT)
Dept: GASTROENTEROLOGY | Facility: CLINIC | Age: 41
End: 2021-06-16

## 2021-07-07 ENCOUNTER — OFFICE VISIT (OUTPATIENT)
Dept: GASTROENTEROLOGY | Facility: CLINIC | Age: 41
End: 2021-07-07
Payer: COMMERCIAL

## 2021-07-07 DIAGNOSIS — K50.00 CROHN'S DISEASE OF SMALL INTESTINE WITHOUT COMPLICATION: Primary | ICD-10-CM

## 2021-07-07 DIAGNOSIS — K21.9 GASTROESOPHAGEAL REFLUX DISEASE, UNSPECIFIED WHETHER ESOPHAGITIS PRESENT: ICD-10-CM

## 2021-07-07 DIAGNOSIS — D84.9 IMMUNOSUPPRESSED STATUS: ICD-10-CM

## 2021-07-07 DIAGNOSIS — M12.80: ICD-10-CM

## 2021-07-07 PROCEDURE — 99215 PR OFFICE/OUTPT VISIT, EST, LEVL V, 40-54 MIN: ICD-10-PCS | Mod: 95,,, | Performed by: INTERNAL MEDICINE

## 2021-07-07 PROCEDURE — 99215 OFFICE O/P EST HI 40 MIN: CPT | Mod: 95,,, | Performed by: INTERNAL MEDICINE

## 2021-07-07 RX ORDER — PANTOPRAZOLE SODIUM 40 MG/1
40 TABLET, DELAYED RELEASE ORAL
COMMUNITY
Start: 2021-05-03 | End: 2022-11-09

## 2021-08-20 ENCOUNTER — PATIENT MESSAGE (OUTPATIENT)
Dept: GASTROENTEROLOGY | Facility: CLINIC | Age: 41
End: 2021-08-20

## 2021-08-20 ENCOUNTER — TELEPHONE (OUTPATIENT)
Dept: GASTROENTEROLOGY | Facility: CLINIC | Age: 41
End: 2021-08-20

## 2021-10-08 ENCOUNTER — PATIENT MESSAGE (OUTPATIENT)
Dept: GASTROENTEROLOGY | Facility: CLINIC | Age: 41
End: 2021-10-08

## 2021-10-22 ENCOUNTER — PATIENT MESSAGE (OUTPATIENT)
Dept: GASTROENTEROLOGY | Facility: CLINIC | Age: 41
End: 2021-10-22

## 2021-11-10 ENCOUNTER — OFFICE VISIT (OUTPATIENT)
Dept: GASTROENTEROLOGY | Facility: CLINIC | Age: 41
End: 2021-11-10
Payer: COMMERCIAL

## 2021-11-10 DIAGNOSIS — E53.8 VITAMIN B12 DEFICIENCY: Primary | ICD-10-CM

## 2021-11-10 PROCEDURE — 99215 OFFICE O/P EST HI 40 MIN: CPT | Mod: 95,,, | Performed by: INTERNAL MEDICINE

## 2021-11-10 PROCEDURE — 99215 PR OFFICE/OUTPT VISIT, EST, LEVL V, 40-54 MIN: ICD-10-PCS | Mod: 95,,, | Performed by: INTERNAL MEDICINE

## 2021-11-10 RX ORDER — FAMOTIDINE 40 MG/1
40 TABLET, FILM COATED ORAL
COMMUNITY
Start: 2021-08-16 | End: 2022-08-16

## 2021-11-10 RX ORDER — CLINDAMYCIN HYDROCHLORIDE 300 MG/1
300 CAPSULE ORAL 3 TIMES DAILY
COMMUNITY
Start: 2021-10-22 | End: 2021-11-10

## 2021-11-10 RX ORDER — CYANOCOBALAMIN 1000 UG/ML
1000 INJECTION, SOLUTION INTRAMUSCULAR; SUBCUTANEOUS
Qty: 4 ML | Refills: 0 | Status: SHIPPED | OUTPATIENT
Start: 2021-11-10 | End: 2022-02-08

## 2021-11-10 RX ORDER — CYANOCOBALAMIN 1000 UG/ML
1000 INJECTION, SOLUTION INTRAMUSCULAR; SUBCUTANEOUS
COMMUNITY

## 2022-11-08 ENCOUNTER — PATIENT MESSAGE (OUTPATIENT)
Dept: GASTROENTEROLOGY | Facility: CLINIC | Age: 42
End: 2022-11-08
Payer: COMMERCIAL

## 2022-11-09 ENCOUNTER — OFFICE VISIT (OUTPATIENT)
Dept: GASTROENTEROLOGY | Facility: CLINIC | Age: 42
End: 2022-11-09
Payer: COMMERCIAL

## 2022-11-09 DIAGNOSIS — K50.00 CROHN'S DISEASE OF SMALL INTESTINE WITHOUT COMPLICATION: ICD-10-CM

## 2022-11-09 DIAGNOSIS — D84.9 IMMUNOSUPPRESSED STATUS: ICD-10-CM

## 2022-11-09 DIAGNOSIS — K21.9 GASTROESOPHAGEAL REFLUX DISEASE, UNSPECIFIED WHETHER ESOPHAGITIS PRESENT: ICD-10-CM

## 2022-11-09 DIAGNOSIS — M12.80: ICD-10-CM

## 2022-11-09 DIAGNOSIS — Z86.19 HISTORY OF CLOSTRIDIOIDES DIFFICILE INFECTION: Primary | ICD-10-CM

## 2022-11-09 PROCEDURE — 99215 PR OFFICE/OUTPT VISIT, EST, LEVL V, 40-54 MIN: ICD-10-PCS | Mod: 95,,, | Performed by: INTERNAL MEDICINE

## 2022-11-09 PROCEDURE — 99215 OFFICE O/P EST HI 40 MIN: CPT | Mod: 95,,, | Performed by: INTERNAL MEDICINE

## 2022-11-09 RX ORDER — TRANEXAMIC ACID 650 MG/1
1300 TABLET ORAL 3 TIMES DAILY
COMMUNITY
Start: 2022-10-14

## 2022-11-09 RX ORDER — SYRINGE WITH NEEDLE, 1 ML 25GX5/8"
SYRINGE, EMPTY DISPOSABLE MISCELLANEOUS
COMMUNITY

## 2022-11-09 RX ORDER — DICYCLOMINE HYDROCHLORIDE 20 MG/1
TABLET ORAL
COMMUNITY
Start: 2022-02-08

## 2022-11-09 RX ORDER — FAMOTIDINE 20 MG/1
20 TABLET, FILM COATED ORAL DAILY
COMMUNITY

## 2022-11-09 RX ORDER — FAMOTIDINE 40 MG/1
TABLET, FILM COATED ORAL
COMMUNITY
End: 2022-11-09

## 2022-11-09 NOTE — LETTER
November 9, 2022        Jason Plunkett MD  [address]  [place] [address]             [facility]Inflammatory Bowel [facility]  [address]  [facility] [address]  Phone: [phone]  Fax: [phone]   Patient: Angelina Castaneda   MR Number: [id number]   YOB: 1980   Date of Visit: 11/9/2022       Dear Dr. Plunkett:    Thank you for referring Angelina Castaneda to me for evaluation. Attached you will find relevant portions of my assessment and plan of care.    If you have questions, please do not hesitate to call me. I look forward to following Angelina Castaneda along with you.    Sincerely,      Arthur Vazquez MD            CC    No Recipients    Enclosure

## 2022-11-09 NOTE — PROGRESS NOTES
[facility]             Inflammatory Bowel Disease   Follow-up  Note              TODAY'S VISIT DATE:  11/9/2022    Chief Complaint:   Chief Complaint   Patient presents with    Crohn's Disease       PCP: Fitz Frias    Previous History:  Angelina Castaneda is a 42 y.o. 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 She had synovectomy.  CRP January 2006 was elevated at 35.4 with ESR 38 and anemia with hemoglobin 10.7.  Symptoms remitted thereafter with methotrexate 15 mg p.o. Weekly (helped with arthropathy and no SE).  At her visit in April 2007 she did report some intermittent pain in both lateral hips.  She continued on Voltaren 75 mg twice a day and methotrexate 15 mg p.o. weekly.  She was lost to follow-up between April 2007 and November 2007 due to insurance changes. In late summer 2007 she began with recurrent pain and swelling of the left knee and by that time had not been taking methotrexate on a regular basis though had decreased the dose to 10 mg once a week.  She restart methotrexate 15 mg once a week with Voltaren 75 mg twice a day.  This seemed to remit her symptoms but not completely resolve the symptoms and therefore biologic treatments discussed and Humira recommended.  She started Humira and then was on 40 mg SC every 2 weeks while continuing methotrexate 15 mg p.o. once a week.  By 2009 she was in clinical remission with her joints and was not requiring Voltaren though had a flare up in  October 2009 that coincided with her being 10 days late for her Humira and decreasing her methotrexate from 10 mg weekly to 7.5 mg weekly.  Her symptoms went into remission with a Medrol Dosepak and increase of methotrexate 10 mg weekly while she continued on Humira 40 mg SC every 2 weeks.  In June 2010 she had progressive and recurrent pain with swelling and stiffness of the left knee and had aspiration intra-articular steroids.  In August 2010 she had return of left knee symptoms and required another Medrol Dosepak.  At that time her Humira was continued and her methotrexate was increased to 15 mg p.o. once a week.  In summary 2011 she continued to have mild anemia with slightly decreased folate and B12 levels.  Overall she was feeling well by fall of 2011 except for some mild recurrence swelling of the left knee and stiffness.  She continued on Humira 40 mg SC every 2 weeks, methotrexate 15 mg p.o. weekly with folic acid 1 mg daily.  By 2013 her methotrexate and Humira discontinued due to ineffective.  In fall 2013 she started simponi and restarted MTX 10 mg po weekly 10/2013. In December 2013 she had recurrent left knee pain and swelling and underwent aspiration and injection with Kenalog.  By May 2014 she continued on Simponi once a month and methotrexate 15 mg p.o. weekly.  In January 2015 she saw surgery for a large external hemorrhoid at which time the plan was to proceed with an external hemorrhoid excision.  Biopsies of the hemorrhoid suggested atypical epithelial change with suspicion for viral cytopathic changes and HPV negative.  Patient recurrence left knees pain and swelling in March of 2015 at which time aspiration and steroid injection was done.  At that time she had continued on Simponi monthly and methotrexate 10 mg weekly.  By April 2015 her methotrexate was increased to 17.5 mg once a week while continuing on monthly Simponi.  In early June 2015 Simponi was discontinued and patient was started  on Cimzia and by June 2015 she was on Cimzia every 2 weeks while continuing methotrexate 15 mg p.o. weekly.  In July 2015 due to recurrence symptoms of large knee inflammatory infusion she took a 2 week course of prednisone which was helpful.  She had repeat aspiration injection of steroids in July of 2015 of her left knee. After being on cimzia for about 2 mos her joint pains worsened considerably.  She started on xeljanz (unknown dose) which helped her joint pains and discontinued within a few mos and this was discontinued due to family planning in future.  She saw surgeons September 2015 again at which time she was found to have irritated anal skin with inflammation and bleeding associated with bowel movements with inflammed perianal skin tag with findings concerning for Crohn's disease. Patient saw Dr. Plunkett on 10/15/2015 and he felt that the perianal issues were more likely to be consistent with fistula.  By this time patient was on Xeljanz for her joint issues.  EGD November 2015 was normal. Colonoscopy showed severe colitis and ileitis in the ileocecal valve and unable to intubate the terminal ileum due to stricturing of the IC valve with mucosal ulcerations.  There is also granulation tissue internally like healed fissure and posterior midline and left lateral fistula granulation with prominence.  Otherwise the colon appeared normal. Patient was started on Pentasa, continued methotrexate and plans were to change from Xeljanz to Humira.  Also ciprofloxacin and Flagyl was started.  Biopsies of the duodenum were normal.  Biopsies of the IC valve showed chronic active colitis.  SBFT November 2015 showed loss of normal fold pattern in the patient's terminal ileum. Around Nov/Dec 2015 she started Humira and MTX 15 mg po weekly. She was not tolerant of Flagyl.  At her visit with Dr. Plunkett December 2015 she requested to stop methotrexate (future family planning) and finally discontinued in January 2016 while  continuing Humira 40 mg SC every 2 weeks and Pentasa.  By March 2016 she was feeling well on this regimen and had minimal bleeding once or twice a week with rare mucus.  She saw Rheumatology April 2016 and May 2016 after developing right knee pain and swelling at which time aspiration injection of steroids was done.  At her visit with Rheumatology May of 2016 her rheumatologist was going to coordinate with Gastroenterology to change Pentasa to sulfasalazine to help with joint issues.  In July 2016 got . In September of 2016 she again had right knee infusion that was aspirated and steroids were injected.  She became pregnant in the end of September 2016 (delivered healthy baby girl by Csection and baby was 3 weeks early- 5/29/17) at which time she was asymptomatic from her bowel disease and at her visit on 10/21/2016 Pentasa was discontinued and she was started on Flagyl.  There is notation of getting Humira levels but but this was deferred due to patient symptomatic improvement by in November of 2016.  She continued on Humira 40 mg SC every 2 weeks as monotherapy during pregnancy and stopped at 28 weeks per recs by Dr. Plunkett. In January 2017 she had aspiration and injection of steroids into her right knee due to her current knee pain and swelling.  She saw Dr. Plunkett 3/31/2017 at which time she was 28 weeks pregnant and he was instructed to discontinue Humira.  Approximately 2 weeks after delivery she developed some abdominal cramping, arthralgia and knee infusion and pain. Her rheumatologist resumed her Humira and she had a rapid response with improve knee pain and resolution of her abdominal cramping though some rectal bleeding including drips of blood with bowel movements intermittently.  She was given Anusol HC cream for suspected perianal or hemorrhoidal bleeding and did not wish to resume Pentasa postpartum (resumed for 2 mos).  In mid September 2017 she had recurrence of right knee pain and  swelling in saw her rheumatologist at which time the knee was again aspirated and injected with steroids.  She saw Dr. Plunkett in January 2018 with stable symptoms with the plan to do a colonoscopy November 2018.  She had tried sulfasalazine for joint issues early on and sometime 2018 she was started again on sulfasalazine which was discontinued 8/2019. Bone density test 8/10/2018 was normal. She underwent a colonoscopy on 8/27/2018 for generalized abdominal pain and chronic diarrhea which showed discontinues areas of nonbleeding ulcerated mucosa at IC valve consistent with Crohn's ulcer that was injected with epinephrine due to bleeding.  She had also perianal skin tags with some some rectal tenderness an anal stricture.  There was ulcerated mucosa at the anus and in the distal rectum.  Small pedunculated polypoid lesion at the IC valve most consistent with pseudopolyps and there was a benign-appearing intrinsic severe stenosis of the IC valve that did not allow intubation of the terminal ileum. Biopsies of the IC valve showed acutely inflamed mucosal ulcer with reactive changes and the polyp was consistent with an inflammatory hyperplastic polyp.  At that time Dr. Plunkett recommended ciprofloxacin and Flagyl for 1 week, prednisone and to increase Humira to 40 mg SC weekly.  Although I do not have the Humira levels there is a note indicating that she had low levels with no antibodies when this change was made.  She tapered off of prednisone by the end of September 2018 while continuing Humira 40 mg SC weekly.  In 92018 she had a miscarriage. At her visit on 9/26/2018 with Dr. Plunkett she mentioned that she would like to get pregnant again and he wrote that he felt that if her symptoms improved during pregnancy then she can decrease her Humira at to 40 mg SC every 2 weeks.  Her symptoms did improved on Humira 40 mg SC weekly with some residual minor bloating and abdominal cramping with rare diarrhea.  In addition  to the Humira she was also on sulfasalazine with folic acid.  By April 2019 she began to family plan again and at that time she had abdominal cramping with possible partial small-bowel obstruction for 2 days with intermittent episodes as well.  Dr. Plunkett was concerned that the IC valve stricture was playing a role in the symptoms and recommended repeat Humira levels and antibodies and if adequate levels then change to Remicade given loss of response of weekly Humira.  She did become pregnant and had a miscarriage in the early summer of 2019.  Dr. Plunkett had discontinued sulfasalazine during her short pregnancy though her maternal fetal medicine doctor recommend that she can remain on it.  Dr. Plunkett saw patient on 8/12/2019 at which time she was continued on Humira SC weekly and her sulfasalazine was increased to 3 g/day.  IV iron was also being considered.  On 8/15/2019 she was seen in the GI clinic again due to fever of 101, lower abdominal pain and discomfort with bilateral suprapubic and right lower quadrant pain. She had blood work showing leukocytosis with a pregnancy test that was negative.  CT abdomen pelvis with IV and oral contrast on 8/15/2019 showed interloop abscess in the right lower quadrant with thick walls and loculation measuring 5.1 x 3.7 cm with peripheral enhancement.  This is adjacent to a very abnormal loop of terminal ileum with significant wall thickening and inflammatory changes.  There is additional edematous changes in the right lower quadrant adjacent to the cecum with mild cecal wall thickening and trace pelvic fluid though appendix was difficult to visualize due to inflammatory process.  There was no small-bowel obstruction or free intraperitoneal air.  When she saw Dr. Plunkett on 8/15/2019 she was afebrile and starting to feel somewhat better.  At that visit Dr. Plunkett recommended stopping Humira, starting Augmentin twice daily for 14 days and Flagyl for 5 days.  He  recommended stopping sulfasalazine and beginning Pentasa 4 g/day.  Labs on 8/16/2019 was significant for sodium 133 with normal creatinine, , white blood cell count 18.2, hemoglobin 11.0.  I have results from a gram stain of an abdominal abscess that showed preliminary findings of Gram-positive cocci.  Along with Candida on fungal cultures and negative anaerobic cultures with 1+ E coli.  She had a negative pregnancy test on 8/19/2019 and and normal pro calcitonin and negative C diff.  Albumin on 8/20/2019 is 2.8.  Patient hospitalized from 8/16-8/20/19 and was admitted from GI clinic when she presented with persistent RLQ abdominal pain, fever and nausea despite a few days of augmentin and flagyl.  As inpatient she was started on zosyn and flagyl and CT showed loculated abscess and she underwent IR guided abscess drainage and abscess cultures grew E coli.  Surgery and ID were consulted. 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  She was then discharged on 8/20/19 with IV ampicillin and po flagyl for 2 weeks with plan for f/u in GI clinic in 1 week and CTE the week after discharge. Patient was again hospitalized from 9/7-9/13/19 at which time it was felt that IV antibiotic treatment was unsuccessful at home and underwent laparoscopic mobilization of right colon with hand assist converted to open resection of distal 1 foot of ileum and cecum with drainage of interloop abscess on 9/10/19. Surgical path showed Crohns disease with clear margins.  She had spinal fluid leak from anesthesia procedure for surgery and had blood patch.  After surgery she recovered well overall though had perianal issues that were ongoing and increased diarrhea though this has improved. She has 1-3 soft BMs/day with some urgency, no blood in stool.  Patient was started on remicade 5 mg/kg with first dose 10/14/19, 2nd dose 10/28/19, 3rd dose 11/26/19 and started imuran 100 mg daily end of 9/2019.  She  continued on folic acid 1 mg daily and also Pepcid BID. She saw Dr. Plunkett on 19 who recommended checking Remicade levels as well as obtaining colonoscopy at [facility]. She has Remicade levels on  which were remarkable for a level of 11 with no antibodies. By this time she was having 0-2 soft non-bloody BM per day.  On 20 Remicade levels 11 with negative antibodies and 20 6TG 258/6MMP 209.  Colonoscopy on 3/3/2020 was normal endoscopically of the colon and terminal ileum though biopsies of terminal ileum showed active ileitis with no dysplasia. On 3/16/20 trough remicade leels 4.7/Abs neg so we increased remicade to 7.5 mg/kg every 8 weeks on 20. She had a miscarriage 2020.  Last visit she added metamucil to her regimen to see if this would help regulate bowel movements since imodium and questran caused constipation. On 20 trough remicade levels 13/Abs neg. CTE 2020 normal SB, mild hepatic stetosis.  On 2021 she delivered a healthy baby by  with no issues during her pregnancy.  On 4/15/2021 trough Remicade levels 11/antibodies negative. In 2021 stool calprotectin 30, trough IFX levels 17/Abs neg on remicade 7.5 mg/kg q 8 weeks.  Colonoscopy by Dr. Plunkett in [place] on 10/2021 showed normal colon except for patchy area of mildly eroded mucosa in rectum with tiny linear erosions, normal TI and localized moderately ulcerated area at anastomosis. Biopsies of rectum c/w chronic active colitis and right colon random bx likely of anastomosis c/w granulation tissue with ulcer bed.     Interval History:  - current IBD meds: Inflectra 7.5 mg/kg q 8 weeks (started 10/14/19, 7.5 mg/kg q 8 wks 20, changed to inflectra 10 mg/kg q 8 weeks 2022, LD , [place] -supposed to be )-[facility], Imuran 100 mg orally daily  - changed from inflectra to remicade 2022  - 0-4 soft BMs/d, no blood, no nocturnal  - antibiotics- last used bactrim for UTI completed 10/30  with resolution of symptoms  - colonoscopy recommended 10/2022- pt to get in 2023  - 2022 C diff - oral vancomycin  - NSAID use: No  - Narcotic use: No  - Alternative/complementary meds for IBD: probiotic    Prior Pertinent Surgeries:   2006 synovectomy  2015 Hemorrhoidectomy  2019 ileocolonic resection (righ colon removed, 1 foot of ileum removed). Surgical path showed Crohns disease with clear margins.    Pertinent Endoscopy/Imagin2015 EGD normal  2020 CTE: normal SB, resection, mild hepatic steatosis, stable fullness of the renal calyces right greater than left  10/1/21 colonoscopy: normal colon except for patchy area of mildly eroded mucosa in rectum with tiny linear erosions, normal TI and localized moderately ulcerated area at anastomosis. Biopsies of rectum c/w chronic active colitis and right colon random bx likely of anastomosis c/w granulation tissue with ulcer bed     Therapeutic Drug Monitoring Labs:  Humira levels:  note indicating that she had low levels with no antibodies when this change was made  20 12 week from start remicade level 11 with antibodies <22  20 6TG 258/6MMP 209  3/16/20 trough remicade levels 4.7/abs negative on remicade 5 mg/kg q 8 weeks, imuran 100 mg/d  20 trough remicade 13/Abs negative on remicade 7.5 mg/kg q 8 wks, imuran 100 mg/d  4/15/21 trough remicade levels 11/Abs negative on remicade 7.5 mg/kg q 8 wks, imuran 100 mg/d   21 trough IFX levels 17/Abs neg on remicade 7.5 mg/kg q 8 wks, imuran 100 mg/d     Prior IBD Therapies:  Prednisone  Sulfasalazine  MTX 7.5 to 15 mg po weekly- discontinued 2016  Humira (started summer 2007, stopped , restarted , stopped 2019)  Simponi (10/2013-2015)  Cimzia (2015)  Xeljanz ()  Pentasa ()    Vaccinations:  No results found for: HEPBSAB  No results found for: HEPAIGG  No results found for: VARICELLAZOS, VARICELLAINT  Immunization History   Administered Date(s) Administered     COVID-19, MRNA, LN-S, PF (MODERNA FULL 0.5 ML DOSE) 01/09/2021, 02/08/2021, 08/21/2021    Hepatitis A, Adult 03/06/2020, 12/03/2020    Influenza 10/10/2011, 10/02/2012, 09/16/2020    Influenza - Quadrivalent - PF *Preferred* (6 months and older) 01/03/2014, 10/08/2014, 10/07/2015, 09/12/2016, 09/18/2017, 09/18/2018, 10/08/2019, 09/21/2020, 10/15/2021    Influenza - Trivalent - PF (ADULT) 10/10/2011, 10/02/2012    Rho (D) Immune Globulin - IM 05/30/2017, 06/15/2021    Tdap 05/03/2017, 04/06/2021     Flu shot: UTD  COVID vaccine/booster: recommended   PCV 13: 6/17/22  PPSV 23: 7/27/22  HPV: NA  Meningococcal: NA  Hepatitis A/B: immune 4/2021 positive Abs  MMR (live vaccine):  Will check immunity in future   Shingrix: recommended    Review of Systems   Constitutional:  Negative for chills, fever and weight loss.   HENT:          No oral ulcers, dysphagia, oral thrush   Eyes:  Negative for blurred vision, pain and redness.   Respiratory:  Negative for cough and shortness of breath.    Cardiovascular:  Negative for chest pain.   Gastrointestinal:  Negative for abdominal pain, heartburn, nausea and vomiting.   Genitourinary:  Negative for dysuria and hematuria.   Musculoskeletal:  Negative for back pain and joint pain.   Skin:  Negative for rash.   Psychiatric/Behavioral:  Negative for depression. The patient is not nervous/anxious and does not have insomnia.      All Medical History/Surgical History/Family History/Social History/Allergies have been reviewed and updated in EMR    Review of patient's allergies indicates:  No Known Allergies    Outpatient Medications Marked as Taking for the 11/9/22 encounter (Office Visit) with Arthur Vazquez MD   Medication Sig Dispense Refill    azaTHIOprine (IMURAN) 50 mg Tab Take 100 mg by mouth once daily.      cholecalciferol, vitamin D3, 5,000 unit capsule Take 5,000 Units by mouth once daily.      cyanocobalamin 1,000 mcg/mL injection Inject 1,000 mcg into the muscle every 30  "days.      infliximab-dyyb (INFLECTRA IV) every 8 weeks.      iron-vitamin C 100-250 mg, ICAR-C, 100-250 mg Tab Contains 100 mg of iron      ondansetron (ZOFRAN) 4 MG tablet       pantoprazole (PROTONIX) 40 MG tablet Take 40 mg by mouth once daily.      syringe with needle (BD LUER-ARVIN SYRINGE) 3 mL 25 gauge x 1" Syrg BD Luer-Arvin Syringe 3 mL 25 gauge x 1"   FOR USE WITH CYANOCOBOLAMIN      tranexamic acid (LYSTEDA) 650 mg tablet Take 1,300 mg by mouth 3 (three) times daily.      [DISCONTINUED] famotidine (PEPCID) 40 MG tablet famotidine 40 mg tablet   TAKE 1 TABLET (40 MG TOTAL) BY MOUTH 2 (TWO) TIMES DAILY.       Vital Signs:  LMP 10/13/2022      Physical Exam    Labs: Reviewed in epic from [facility]    Assessment/Plan:  Angelina Castaneda is a 42 y.o. 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 Since last visit she had C diff txed with oral vancomycin in 2/2022.     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 (h/o right knee swelling/effusion requiring aspiration and steroid injections)- inactive  - " postpartum-reminded-  no live vaccines for  for first 6 mos,  breastfeeding- safe with remicade, after taking imuran try to avoid breastfeeding for 4 hours after taking a dose  - MONIKA- on ICarC, H/H normal 2021  - continue inflectra 7.5 mg/kg q 8 weeks  - continue imuran 100 mg po daily  - stool calprotectin normal 2021  - colonoscopy- was due 10/2022 but pt to get in 2023 and email me results  - vitamin B12 (2022 302)- prior to these levels pt had been off of vit B12 for 3 mos, has consistently been taking since then and will repeat vit B12 2022  - drug monitoring labs: CBC/CMP q 3 mos (2022-defer to 22), TPMT (normal 2019), TB quantiferon (neg 2022, next due 2023- recommend with 2022 labs), Hep B testing (was due 2022, recommend with 2022 labs)  - TDM:  Trough IFX levels 22    # GERD:  - alternating pepcid and protonix and advised that she can consider Pepcid twice a day and or Tums if she would like to avoid Protonix given history of C diff this year and concern for bone health    # IBD specific health maintenance:  CRC risk- <1/3 colon involved, average risk  Skin exam yearly - normal 2022, next due 2023, outside dermatologist  Risk for osteopenia/osteoporosis- none  Pap smear yearly- normal 10/2022, next due 10/2023  Vitamin D:  continues on vit D 5000 IU/day  Vaccines: no live vaccines, recommend shingrix and covid booster    Follow up: 1 year video visit     The patient location is: Home  The chief complaint leading to consultation is: Crohn's disease    Visit type: audiovisual    Face to Face time with patient: 20 minutes  40 minutes of total time spent on the encounter, which includes face to face time and non-face to face time preparing to see the patient (eg, review of tests), Obtaining and/or reviewing separately obtained history, Documenting clinical information in the electronic or other health record, Independently interpreting results (not separately  reported) and communicating results to the patient/family/caregiver, or Care coordination (not separately reported).     Each patient to whom he or she provides medical services by telemedicine is:  (1) informed of the relationship between the physician and patient and the respective role of any other health care provider with respect to management of the patient; and (2) notified that he or she may decline to receive medical services by telemedicine and may withdraw from such care at any time.    Arthur Vzaquez MD  Department of Gastroenterology  Medical Director, Inflammatory Bowel Disease

## 2022-11-09 NOTE — PROGRESS NOTES
IBD PATIENT INTAKE:    COVID symptoms in the last 7 days (runny nose, sore throat, congestion, cough, fever): No  PCP: Fitz Frias  If not PCP-  number given to establish [phone]: N/A    ALLERGIES REVIEWED:  Yes    CHIEF COMPLAINT:    Chief Complaint   Patient presents with    Crohn's Disease       VITAL SIGNS:  [facility] 10/13/2022      Change in medical, surgical, family or social history: No    IBD THERAPY (name, dose/frequency):  inflectra 7.5 mg/kg q8w Imuran 100mg  Last dose:  11/02    Next dose:  12/2/8  Infusion/Pharmacy: [facility]    NSAIDs (aspirin, ibuprofen-advil or motrin, naproxen-aleve, diclofenac-voltaren, BC powder, excedrin, goodies): No    Alternative/Complementary Medications (i.e. probiotics, turmeric, fish oil, aloe vera):      no  Name/dose:  n/a    Vitamins:   Vit D:  5000U      Vit B-12:  1000mcg q30d   Folic Acid: no       Calcium: no     Iron:  100mg      MVI: no    Antibiotics (past 30 Days):  yes  If yes   Indication: UTI  Name of antibiotic: Bactrim  Completion date: 10/30    REVIEWED MEDICATION LIST RECONCILED INCLUDING ABOVE MEDS:  yes

## 2022-11-22 ENCOUNTER — PATIENT MESSAGE (OUTPATIENT)
Dept: GASTROENTEROLOGY | Facility: CLINIC | Age: 42
End: 2022-11-22
Payer: COMMERCIAL

## 2023-01-12 ENCOUNTER — PATIENT MESSAGE (OUTPATIENT)
Dept: GASTROENTEROLOGY | Facility: CLINIC | Age: 43
End: 2023-01-12
Payer: COMMERCIAL

## 2023-01-17 ENCOUNTER — PATIENT MESSAGE (OUTPATIENT)
Dept: GASTROENTEROLOGY | Facility: CLINIC | Age: 43
End: 2023-01-17
Payer: COMMERCIAL

## 2023-10-24 ENCOUNTER — PATIENT MESSAGE (OUTPATIENT)
Dept: GASTROENTEROLOGY | Facility: CLINIC | Age: 43
End: 2023-10-24
Payer: COMMERCIAL

## 2023-11-06 ENCOUNTER — PATIENT MESSAGE (OUTPATIENT)
Dept: GASTROENTEROLOGY | Facility: CLINIC | Age: 43
End: 2023-11-06
Payer: COMMERCIAL

## 2023-11-08 ENCOUNTER — OFFICE VISIT (OUTPATIENT)
Dept: GASTROENTEROLOGY | Facility: CLINIC | Age: 43
End: 2023-11-08
Payer: COMMERCIAL

## 2023-11-08 DIAGNOSIS — K50.00 CROHN'S DISEASE OF SMALL INTESTINE WITHOUT COMPLICATION: Primary | ICD-10-CM

## 2023-11-08 DIAGNOSIS — M12.80: ICD-10-CM

## 2023-11-08 DIAGNOSIS — K21.9 GASTROESOPHAGEAL REFLUX DISEASE, UNSPECIFIED WHETHER ESOPHAGITIS PRESENT: ICD-10-CM

## 2023-11-08 PROCEDURE — 1159F MED LIST DOCD IN RCRD: CPT | Mod: CPTII,95,, | Performed by: INTERNAL MEDICINE

## 2023-11-08 PROCEDURE — 99215 OFFICE O/P EST HI 40 MIN: CPT | Mod: 95,,, | Performed by: INTERNAL MEDICINE

## 2023-11-08 PROCEDURE — 99215 PR OFFICE/OUTPT VISIT, EST, LEVL V, 40-54 MIN: ICD-10-PCS | Mod: 95,,, | Performed by: INTERNAL MEDICINE

## 2023-11-08 PROCEDURE — 1160F PR REVIEW ALL MEDS BY PRESCRIBER/CLIN PHARMACIST DOCUMENTED: ICD-10-PCS | Mod: CPTII,95,, | Performed by: INTERNAL MEDICINE

## 2023-11-08 PROCEDURE — 1159F PR MEDICATION LIST DOCUMENTED IN MEDICAL RECORD: ICD-10-PCS | Mod: CPTII,95,, | Performed by: INTERNAL MEDICINE

## 2023-11-08 PROCEDURE — 1160F RVW MEDS BY RX/DR IN RCRD: CPT | Mod: CPTII,95,, | Performed by: INTERNAL MEDICINE

## 2023-11-08 NOTE — PROGRESS NOTES
[facility]             Inflammatory Bowel Disease   Follow-up  Note              TODAY'S VISIT DATE:  11/8/2023    Chief Complaint:   Chief Complaint   Patient presents with    Crohn's Disease     PCP: Fitz Frias    Previous History:  Angelina Castaneda is a 43 y.o. 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 She had synovectomy.  CRP January 2006 was elevated at 35.4 with ESR 38 and anemia with hemoglobin 10.7.  Symptoms remitted thereafter with methotrexate 15 mg p.o. Weekly (helped with arthropathy and no SE).  At her visit in April 2007 she did report some intermittent pain in both lateral hips.  She continued on Voltaren 75 mg twice a day and methotrexate 15 mg p.o. weekly.  She was lost to follow-up between April 2007 and November 2007 due to insurance changes. In late summer 2007 she began with recurrent pain and swelling of the left knee and by that time had not been taking methotrexate on a regular basis though had decreased the dose to 10 mg once a week.  She restart methotrexate 15 mg once a week with Voltaren 75 mg twice a day.  This seemed to remit her symptoms but not completely resolve the symptoms and therefore biologic treatments discussed and Humira recommended.  She started Humira and then was on 40 mg SC every 2 weeks while continuing methotrexate 15 mg p.o. once a week.  By 2009 she was in clinical remission with her joints and was not requiring Voltaren though had a flare up in  October 2009 that coincided with her being 10 days late for her Humira and decreasing her methotrexate from 10 mg weekly to 7.5 mg weekly.  Her symptoms went into remission with a Medrol Dosepak and increase of methotrexate 10 mg weekly while she continued on Humira 40 mg SC every 2 weeks.  In June 2010 she had progressive and recurrent pain with swelling and stiffness of the left knee and had aspiration intra-articular steroids.  In August 2010 she had return of left knee symptoms and required another Medrol Dosepak.  At that time her Humira was continued and her methotrexate was increased to 15 mg p.o. once a week.  In summary 2011 she continued to have mild anemia with slightly decreased folate and B12 levels.  Overall she was feeling well by fall of 2011 except for some mild recurrence swelling of the left knee and stiffness.  She continued on Humira 40 mg SC every 2 weeks, methotrexate 15 mg p.o. weekly with folic acid 1 mg daily.  By 2013 her methotrexate and Humira discontinued due to ineffective.  In fall 2013 she started simponi and restarted MTX 10 mg po weekly 10/2013. In December 2013 she had recurrent left knee pain and swelling and underwent aspiration and injection with Kenalog.  By May 2014 she continued on Simponi once a month and methotrexate 15 mg p.o. weekly.  In January 2015 she saw surgery for a large external hemorrhoid at which time the plan was to proceed with an external hemorrhoid excision.  Biopsies of the hemorrhoid suggested atypical epithelial change with suspicion for viral cytopathic changes and HPV negative.  Patient recurrence left knees pain and swelling in March of 2015 at which time aspiration and steroid injection was done.  At that time she had continued on Simponi monthly and methotrexate 10 mg weekly.  By April 2015 her methotrexate was increased to 17.5 mg once a week while continuing on monthly Simponi.  In early June 2015 Simponi was discontinued and patient was started  on Cimzia and by June 2015 she was on Cimzia every 2 weeks while continuing methotrexate 15 mg p.o. weekly.  In July 2015 due to recurrence symptoms of large knee inflammatory infusion she took a 2 week course of prednisone which was helpful.  She had repeat aspiration injection of steroids in July of 2015 of her left knee. After being on cimzia for about 2 mos her joint pains worsened considerably.  She started on xeljanz (unknown dose) which helped her joint pains and discontinued within a few mos and this was discontinued due to family planning in future.  She saw surgeons September 2015 again at which time she was found to have irritated anal skin with inflammation and bleeding associated with bowel movements with inflammed perianal skin tag with findings concerning for Crohn's disease. Patient saw Dr. Plunkett on 10/15/2015 and he felt that the perianal issues were more likely to be consistent with fistula.  By this time patient was on Xeljanz for her joint issues.  EGD November 2015 was normal. Colonoscopy showed severe colitis and ileitis in the ileocecal valve and unable to intubate the terminal ileum due to stricturing of the IC valve with mucosal ulcerations.  There is also granulation tissue internally like healed fissure and posterior midline and left lateral fistula granulation with prominence.  Otherwise the colon appeared normal. Patient was started on Pentasa, continued methotrexate and plans were to change from Xeljanz to Humira.  Also ciprofloxacin and Flagyl was started.  Biopsies of the duodenum were normal.  Biopsies of the IC valve showed chronic active colitis.  SBFT November 2015 showed loss of normal fold pattern in the patient's terminal ileum. Around Nov/Dec 2015 she started Humira and MTX 15 mg po weekly. She was not tolerant of Flagyl.  At her visit with Dr. Plunkett December 2015 she requested to stop methotrexate (future family planning) and finally discontinued in January 2016 while  continuing Humira 40 mg SC every 2 weeks and Pentasa.  By March 2016 she was feeling well on this regimen and had minimal bleeding once or twice a week with rare mucus.  She saw Rheumatology April 2016 and May 2016 after developing right knee pain and swelling at which time aspiration injection of steroids was done.  At her visit with Rheumatology May of 2016 her rheumatologist was going to coordinate with Gastroenterology to change Pentasa to sulfasalazine to help with joint issues.  In July 2016 got . In September of 2016 she again had right knee infusion that was aspirated and steroids were injected.  She became pregnant in the end of September 2016 (delivered healthy baby girl by Csection and baby was 3 weeks early- 5/29/17) at which time she was asymptomatic from her bowel disease and at her visit on 10/21/2016 Pentasa was discontinued and she was started on Flagyl.  There is notation of getting Humira levels but but this was deferred due to patient symptomatic improvement by in November of 2016.  She continued on Humira 40 mg SC every 2 weeks as monotherapy during pregnancy and stopped at 28 weeks per recs by Dr. Plunkett. In January 2017 she had aspiration and injection of steroids into her right knee due to her current knee pain and swelling.  She saw Dr. Plunkett 3/31/2017 at which time she was 28 weeks pregnant and he was instructed to discontinue Humira.  Approximately 2 weeks after delivery she developed some abdominal cramping, arthralgia and knee infusion and pain. Her rheumatologist resumed her Humira and she had a rapid response with improve knee pain and resolution of her abdominal cramping though some rectal bleeding including drips of blood with bowel movements intermittently.  She was given Anusol HC cream for suspected perianal or hemorrhoidal bleeding and did not wish to resume Pentasa postpartum (resumed for 2 mos).  In mid September 2017 she had recurrence of right knee pain and  swelling in saw her rheumatologist at which time the knee was again aspirated and injected with steroids.  She saw Dr. Plunkett in January 2018 with stable symptoms with the plan to do a colonoscopy November 2018.  She had tried sulfasalazine for joint issues early on and sometime 2018 she was started again on sulfasalazine which was discontinued 8/2019. Bone density test 8/10/2018 was normal. She underwent a colonoscopy on 8/27/2018 for generalized abdominal pain and chronic diarrhea which showed discontinues areas of nonbleeding ulcerated mucosa at IC valve consistent with Crohn's ulcer that was injected with epinephrine due to bleeding.  She had also perianal skin tags with some some rectal tenderness an anal stricture.  There was ulcerated mucosa at the anus and in the distal rectum.  Small pedunculated polypoid lesion at the IC valve most consistent with pseudopolyps and there was a benign-appearing intrinsic severe stenosis of the IC valve that did not allow intubation of the terminal ileum. Biopsies of the IC valve showed acutely inflamed mucosal ulcer with reactive changes and the polyp was consistent with an inflammatory hyperplastic polyp.  At that time Dr. Plunkett recommended ciprofloxacin and Flagyl for 1 week, prednisone and to increase Humira to 40 mg SC weekly.  Although I do not have the Humira levels there is a note indicating that she had low levels with no antibodies when this change was made.  She tapered off of prednisone by the end of September 2018 while continuing Humira 40 mg SC weekly.  In 92018 she had a miscarriage. At her visit on 9/26/2018 with Dr. Plunkett she mentioned that she would like to get pregnant again and he wrote that he felt that if her symptoms improved during pregnancy then she can decrease her Humira at to 40 mg SC every 2 weeks.  Her symptoms did improved on Humira 40 mg SC weekly with some residual minor bloating and abdominal cramping with rare diarrhea.  In addition  to the Humira she was also on sulfasalazine with folic acid.  By April 2019 she began to family plan again and at that time she had abdominal cramping with possible partial small-bowel obstruction for 2 days with intermittent episodes as well.  Dr. Plunkett was concerned that the IC valve stricture was playing a role in the symptoms and recommended repeat Humira levels and antibodies and if adequate levels then change to Remicade given loss of response of weekly Humira.  She did become pregnant and had a miscarriage in the early summer of 2019.  Dr. Plunkett had discontinued sulfasalazine during her short pregnancy though her maternal fetal medicine doctor recommend that she can remain on it.  Dr. Plunkett saw patient on 8/12/2019 at which time she was continued on Humira SC weekly and her sulfasalazine was increased to 3 g/day.  IV iron was also being considered.  On 8/15/2019 she was seen in the GI clinic again due to fever of 101, lower abdominal pain and discomfort with bilateral suprapubic and right lower quadrant pain. She had blood work showing leukocytosis with a pregnancy test that was negative.  CT abdomen pelvis with IV and oral contrast on 8/15/2019 showed interloop abscess in the right lower quadrant with thick walls and loculation measuring 5.1 x 3.7 cm with peripheral enhancement.  This is adjacent to a very abnormal loop of terminal ileum with significant wall thickening and inflammatory changes.  There is additional edematous changes in the right lower quadrant adjacent to the cecum with mild cecal wall thickening and trace pelvic fluid though appendix was difficult to visualize due to inflammatory process.  There was no small-bowel obstruction or free intraperitoneal air.  When she saw Dr. Plunkett on 8/15/2019 she was afebrile and starting to feel somewhat better.  At that visit Dr. Plunkett recommended stopping Humira, starting Augmentin twice daily for 14 days and Flagyl for 5 days.  He  recommended stopping sulfasalazine and beginning Pentasa 4 g/day.  Labs on 8/16/2019 was significant for sodium 133 with normal creatinine, , white blood cell count 18.2, hemoglobin 11.0.  I have results from a gram stain of an abdominal abscess that showed preliminary findings of Gram-positive cocci.  Along with Candida on fungal cultures and negative anaerobic cultures with 1+ E coli.  She had a negative pregnancy test on 8/19/2019 and and normal pro calcitonin and negative C diff.  Albumin on 8/20/2019 is 2.8.  Patient hospitalized from 8/16-8/20/19 and was admitted from GI clinic when she presented with persistent RLQ abdominal pain, fever and nausea despite a few days of augmentin and flagyl.  As inpatient she was started on zosyn and flagyl and CT showed loculated abscess and she underwent IR guided abscess drainage and abscess cultures grew E coli.  Surgery and ID were consulted. 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  She was then discharged on 8/20/19 with IV ampicillin and po flagyl for 2 weeks with plan for f/u in GI clinic in 1 week and CTE the week after discharge. Patient was again hospitalized from 9/7-9/13/19 at which time it was felt that IV antibiotic treatment was unsuccessful at home and underwent laparoscopic mobilization of right colon with hand assist converted to open resection of distal 1 foot of ileum and cecum with drainage of interloop abscess on 9/10/19. Surgical path showed Crohns disease with clear margins.  She had spinal fluid leak from anesthesia procedure for surgery and had blood patch.  After surgery she recovered well overall though had perianal issues that were ongoing and increased diarrhea though this has improved. She has 1-3 soft BMs/day with some urgency, no blood in stool.  Patient was started on remicade 5 mg/kg with first dose 10/14/19, 2nd dose 10/28/19, 3rd dose 11/26/19 and started imuran 100 mg daily end of 9/2019.  She  continued on folic acid 1 mg daily and also Pepcid BID. She saw Dr. Plunkett on 19 who recommended checking Remicade levels as well as obtaining [place] [facility] [place]. She has Remicade levels on  which were remarkable for a level of 11 with no antibodies. By this time she was having 0-2 soft non-bloody BM per day.  On 20 Remicade levels 11 with negative antibodies and 20 6TG 258/6MMP 209.  Colonoscopy on 3/3/2020 was normal endoscopically of the colon and terminal ileum though biopsies of terminal ileum showed active ileitis with no dysplasia. On 3/16/20 trough remicade leels 4.7/Abs neg so we increased remicade to 7.5 mg/kg every 8 weeks on 20. She had a miscarriage 2020.  Last visit she added metamucil to her regimen to see if this would help regulate bowel movements since imodium and questran caused constipation. On 20 trough remicade levels 13/Abs neg. CTE 2020 normal SB, mild hepatic stetosis.  On 2021 she delivered a healthy baby by  with no issues during her pregnancy.  On 4/15/2021 trough Remicade levels 11/antibodies negative. In 2021 stool calprotectin 30, trough IFX levels 17/Abs neg on remicade 7.5 mg/kg q 8 weeks.  Colonoscopy by Dr. Plunkett in [place] on 10/2021 showed normal colon except for patchy area of mildly eroded mucosa in rectum with tiny linear erosions, normal TI and localized moderately ulcerated area at anastomosis. Biopsies of rectum c/w chronic active colitis and right colon random bx likely of anastomosis c/w granulation tissue with ulcer bed.     Interval History:  - current IBD meds: Inflectra 7.5 mg/kg q 8 weeks (started remicade 10/14/19, remicade 7.5 mg/kg q 8 wks 20, inflectra 10 mg/kg q 8 weeks 2022, missed a dose 2023 due to insurance, LD 10/3, ND due  but plans to change to Skyrizi 23), Imuran 100 mg orally daily- held 23 for 3 weeks due to her concerns about low lymphocyte counts and now back on it  -  2023 colonoscopy:  perianal skin tags, perianal fistula, ileocolonic anastomotic ulceration, normal neoterminal ileum  - 23 SBFT normal  - 23 E coli UTI- macrobid, last UTI 10/30/22  - 3/14/23 stool calprotectin  - recurrent C diff- 2022-oral vancomycin, 23, 3/24/23- dificid, 23- rifaximin  - 1-2 days/month she has abdominal cramping with urgency and diarrhea and 3 times in past 3 mos- severe cramping for an hour, nausea/vomiting with diaphoresis and with abdominal massage and then has BM some bloating  - increased joint pains  - NSAID use: No  - Narcotic use: No  - Alternative/complementary meds for IBD: probiotic    Prior Pertinent Surgeries:    synovectomy  2015 Hemorrhoidectomy  2019 ileocolonic resection (righ colon removed, 1 foot of ileum removed). Surgical path showed Crohns disease with clear margins.    Pertinent Endoscopy/Imagin2015 EGD normal  2020 CTE: normal SB, resection, mild hepatic steatosis, stable fullness of the renal calyces right greater than left  10/1/21 colonoscopy: normal colon except for patchy area of mildly eroded mucosa in rectum with tiny linear erosions, normal TI and localized moderately ulcerated area at anastomosis. Biopsies of rectum c/w chronic active colitis and right colon random bx likely of anastomosis c/w granulation tissue with ulcer bed     Therapeutic Drug Monitoring Labs:  Humira levels:  note indicating that she had low levels with no antibodies when this change was made  20 12 week from start remicade level 11 with antibodies <22  20 6TG 258/6MMP 209  3/16/20 trough remicade levels 4.7/abs negative on remicade 5 mg/kg q 8 weeks, imuran 100 mg/d  20 trough remicade 13/Abs negative on remicade 7.5 mg/kg q 8 wks, imuran 100 mg/d  4/15/21 trough remicade levels 11/Abs negative on remicade 7.5 mg/kg q 8 wks, imuran 100 mg/d   21 trough IFX levels 17/Abs neg on remicade 7.5 mg/kg q 8 wks, imuran 100 mg/d  "    Prior IBD Therapies:  Prednisone  Sulfasalazine  MTX 7.5 to 15 mg po weekly- discontinued 1/2016  Humira (started summer 2007, stopped 2013, restarted 2015, stopped 8/2019)  Simponi (10/2013-6/2015)  Cimzia (6/2015)  Xeljanz (2015)  Pentasa (2015)    Vaccinations:  No results found for: "HEPBSAB"  No results found for: "HEPAIGG"  No results found for: "VARICELLAZOS", "VARICELLAINT"  Immunization History   Administered Date(s) Administered    COVID-19, MRNA, LN-S, PF (MODERNA FULL 0.5 ML DOSE) 01/09/2021, 02/08/2021, 08/21/2021    Hepatitis A, Adult 03/06/2020, 12/03/2020    Influenza 10/10/2011, 10/02/2012, 09/16/2020    Influenza - Quadrivalent - PF *Preferred* (6 months and older) 01/03/2014, 10/08/2014, 10/07/2015, 09/12/2016, 09/18/2017, 09/18/2018, 10/08/2019, 09/21/2020, 10/15/2021    Influenza - Trivalent - PF (ADULT) 10/10/2011, 10/02/2012    Rho (D) Immune Globulin - IM 05/30/2017, 06/15/2021    Tdap 05/03/2017, 04/06/2021     Flu shot: yearly  COVID vaccine/booster: recommended   PCV 20: recommended 6/2027  HPV: NA  Meningococcal: NA  Hepatitis A/B: immune 4/2021 positive Abs  MMR (live vaccine):  defer checking MMR titers to assess immunity though if not immune contraindicated as a live vaccine and recommend that household be vaccinated if they are not already  Shingrix: recommended    Review of Systems   Constitutional:  Negative for chills, fever and weight loss.   HENT:          No oral ulcers, dysphagia, oral thrush   Eyes:  Negative for blurred vision, pain and redness.   Respiratory:  Negative for cough and shortness of breath.    Cardiovascular:  Negative for chest pain.   Gastrointestinal:  Negative for abdominal pain, heartburn, nausea and vomiting.   Genitourinary:  Negative for dysuria and hematuria.   Musculoskeletal:  Negative for back pain and joint pain.   Skin:  Negative for rash.   Psychiatric/Behavioral:  Negative for depression. The patient is not nervous/anxious and does not have " "insomnia.      All Medical History/Surgical History/Family History/Social History/Allergies have been reviewed and updated in EMR    Review of patient's allergies indicates:  No Known Allergies    Outpatient Medications Marked as Taking for the 11/8/23 encounter (Office Visit) with Arthur Vazquez MD   Medication Sig Dispense Refill    cholecalciferol, vitamin D3, 5,000 unit capsule Take 5,000 Units by mouth once daily.      cyanocobalamin 1,000 mcg/mL injection Inject 1,000 mcg into the muscle every 30 days.      dicyclomine (BENTYL) 20 mg tablet dicyclomine 20 mg tablet   TAKE 1 TABLET (20 MG TOTAL) BY MOUTH 3 (THREE) TIMES DAILY.      famotidine (PEPCID) 20 MG tablet Take 20 mg by mouth once daily.      infliximab-dyyb (INFLECTRA IV) Inject 10 mg into the vein every 8 weeks. 7.5 mg/kg every 8 weeks      iron-vitamin C 100-250 mg, ICAR-C, 100-250 mg Tab Take 1 tablet by mouth once daily. Contains 100 mg of iron      ondansetron (ZOFRAN) 4 MG tablet Take 4 mg by mouth as needed.      syringe with needle (BD LUER-ARVIN SYRINGE) 3 mL 25 gauge x 1" Syrg BD Luer-Arvin Syringe 3 mL 25 gauge x 1"   FOR USE WITH CYANOCOBOLAMIN      tranexamic acid (LYSTEDA) 650 mg tablet Take 1,300 mg by mouth 3 (three) times daily.      viloxazine 200 mg Cp24 Take 200 mg by mouth once daily.       Vital Signs:  There were no vitals taken for this visit.     Physical Exam    Labs: Reviewed in epic from [facility]    Assessment/Plan:  Angelina Castaneda is a 43 y.o. 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     Since patient's last visit she continues on Inflectra 7.5 mg/kg every 8 weeks and consistently taking this " "besides missed dose 4/2023 due to insurance issues and she continues on imuran 100 mg/d (3 weeks off in 9/2023 due to low lymphocytes per pt preference).  She has had recurrent infections, worsening joint pains since changing from remicade to inflectra and some GI symptoms that may be related to IBS and possible partial SBOs and preference of SC dosing of medications.  Due to these factors she and her primary GI doctor planned on changing from inflectra to skyrizi and she is scheduled for first skyrizi dose on 11/21/23. If she has another episode of abdominal pain/straining/nausea/vomiting I would encourage her to get asap imaging to evaluate for partial SBO since this could be from adhesions. She has no active Crohn's disease at this time based on colonoscopy/SBFT 8/2023 significant only for anastomotic ulcer and normal neoterminal ileum. We discussed risks of changing from inflectra to skyrizi including risk of recurrent CD including perianal disease (hard to know if skyrizi will keep this in remission though TNF inhibitors have better "track" record in my opinion) and risk of immunogenicity not allowing her to restart IFX in future. She is only naive to IL inhibitors and rinvoq.  I discussed risks and benefits of changing treatment with patient and her primary gastroenterologist, Dr. Hirsch. I do recommend getting trough IFX levels 11/27 regardless of whether she changes treatment to help with future decisions. They will make a shared decision regarding next steps. I would continue imuran regardless and if IFX continued would switch back to brand name remicade and may be able to optimize based on drug levels and hopefully stop imuran in future. If changing to skyrizi would overlap imuran with skyrizi until skyrizi is fully effective before switching.     # Crohn's disease and pregnant (ileal/ICV, perianal skin tags/anal stricture, s/p ileal/right colon resection due to intrabdominal abscess 9/2019)  - HLA B27 " positive inflammatory arthropathy vs possible psoriatric arthropathy (h/o right knee swelling/effusion requiring aspiration and steroid injections)- inactive  - MONIKA- on ICarC, H/H normal 8/2021  - continue inflectra 7.5 mg/kg q 8 weeks- next dose 11/27 unless she proceeds with skyrizi which is scheduled 11/21- pt and primary GI to make final decision regarding treatment based on information discussed today  - continue imuran 100 mg po daily and may be able to withdraw in future depending on whether IFX optimized vs skyrizi effective but would continue for now so no more than one change at a time  - stool calprotectin normal 8/2021  - colonoscopy-   - vitamin B12- continue vit B12 1000 mcg SB q 4 weeks, recommend repeat vit B12 yearly-next due 12/2022  - drug monitoring labs: CBC/CMP q 3 mos (due 1/2024- defer to Dr. Plunkett), TPMT (normal 9/2019), TB quantiferon (recommend yearly due to RFs, neg 12/2022, next due 12/2023), Hep B testing (recommend yearly- neg 1/2022, next duwas due 4/2022, recommend with 12/2022 labs)  - TDM:  Trough IFX levels 12/27/22    # GERD:  - alternating pepcid and protonix and advised that she can consider Pepcid twice a day and or Tums if she would like to avoid Protonix given history of C diff this year and concern for bone health    # IBD specific health maintenance:  CRC risk- <1/3 colon involved, average risk  Skin exam yearly - normal 6/2023, next due 6/2024, outside dermatologist  Risk for osteopenia/osteoporosis- none  Pap smear yearly- per patient pending  Vitamin D:  continues on vit D 5000 IU/day  Vaccines: no live vaccines, recommend flu shot    No follow-ups on file.    The patient location is: Home  The chief complaint leading to consultation is: Crohn's disease    Visit type: audiovisual    Face to Face time with patient: 20 minutes  40 minutes of total time spent on the encounter, which includes face to face time and non-face to face time preparing to see the patient (eg,  review of tests), Obtaining and/or reviewing separately obtained history, Documenting clinical information in the electronic or other health record, Independently interpreting results (not separately reported) and communicating results to the patient/family/caregiver, or Care coordination (not separately reported).     Each patient to whom he or she provides medical services by telemedicine is:  (1) informed of the relationship between the physician and patient and the respective role of any other health care provider with respect to management of the patient; and (2) notified that he or she may decline to receive medical services by telemedicine and may withdraw from such care at any time.    Arthur Vazquez MD  Department of Gastroenterology  Medical Director, Inflammatory Bowel Disease

## 2023-11-08 NOTE — LETTER
November 8, 2023        Jason Plunkett MD  [address]  [place] [address]     [facility]/Inflammatory Bowel Disease  [address]  [facility] [address]  Phone: [phone]  Fax: [phone]   Patient: Angelina Castaneda   MR Number: [id number]   YOB: 1980   Date of Visit: 11/8/2023       Dear Dr. Plunkett:    Thank you for referring Angelina Castaneda to me for evaluation. Attached you will find relevant portions of my assessment and plan of care.    If you have questions, please do not hesitate to call me. I look forward to following Angelina Castaneda along with you.    Sincerely,      Arthur Vazquez MD            CC  No Recipients    Enclosure